# Patient Record
Sex: FEMALE | Race: WHITE | NOT HISPANIC OR LATINO | Employment: OTHER | URBAN - METROPOLITAN AREA
[De-identification: names, ages, dates, MRNs, and addresses within clinical notes are randomized per-mention and may not be internally consistent; named-entity substitution may affect disease eponyms.]

---

## 2017-01-26 ENCOUNTER — OFFICE VISIT (OUTPATIENT)
Dept: FAMILY MEDICINE | Facility: CLINIC | Age: 62
End: 2017-01-26
Payer: COMMERCIAL

## 2017-01-26 VITALS
OXYGEN SATURATION: 95 % | RESPIRATION RATE: 15 BRPM | BODY MASS INDEX: 33.99 KG/M2 | TEMPERATURE: 98.4 F | HEIGHT: 65 IN | WEIGHT: 204 LBS | SYSTOLIC BLOOD PRESSURE: 130 MMHG | DIASTOLIC BLOOD PRESSURE: 80 MMHG | HEART RATE: 74 BPM

## 2017-01-26 DIAGNOSIS — D17.1 BENIGN LIPOMATOUS NEOPLASM OF SKIN AND SUBCUTANEOUS TISSUE OF TRUNK: ICD-10-CM

## 2017-01-26 DIAGNOSIS — Z11.59 NEED FOR HEPATITIS C SCREENING TEST: ICD-10-CM

## 2017-01-26 DIAGNOSIS — R73.03 PRE-DIABETES: ICD-10-CM

## 2017-01-26 DIAGNOSIS — E03.9 HYPOTHYROIDISM, UNSPECIFIED TYPE: ICD-10-CM

## 2017-01-26 DIAGNOSIS — Z00.00 ROUTINE GENERAL MEDICAL EXAMINATION AT A HEALTH CARE FACILITY: Primary | ICD-10-CM

## 2017-01-26 DIAGNOSIS — F32.9 REACTIVE DEPRESSION (SITUATIONAL): ICD-10-CM

## 2017-01-26 LAB
ALBUMIN SERPL-MCNC: 4.1 G/DL (ref 3.4–5)
ALP SERPL-CCNC: 86 U/L (ref 40–150)
ALT SERPL W P-5'-P-CCNC: 22 U/L (ref 0–50)
ANION GAP SERPL CALCULATED.3IONS-SCNC: 8 MMOL/L (ref 3–14)
AST SERPL W P-5'-P-CCNC: 10 U/L (ref 0–45)
BILIRUB SERPL-MCNC: 0.4 MG/DL (ref 0.2–1.3)
BUN SERPL-MCNC: 16 MG/DL (ref 7–30)
CALCIUM SERPL-MCNC: 9.3 MG/DL (ref 8.5–10.1)
CHLORIDE SERPL-SCNC: 107 MMOL/L (ref 94–109)
CHOLEST SERPL-MCNC: 225 MG/DL
CO2 SERPL-SCNC: 27 MMOL/L (ref 20–32)
CREAT SERPL-MCNC: 0.95 MG/DL (ref 0.52–1.04)
GFR SERPL CREATININE-BSD FRML MDRD: 60 ML/MIN/1.7M2
GLUCOSE SERPL-MCNC: 97 MG/DL (ref 70–99)
HBA1C MFR BLD: 5.5 % (ref 4.3–6)
HDLC SERPL-MCNC: 53 MG/DL
LDLC SERPL CALC-MCNC: 129 MG/DL
NONHDLC SERPL-MCNC: 172 MG/DL
POTASSIUM SERPL-SCNC: 4.4 MMOL/L (ref 3.4–5.3)
PROT SERPL-MCNC: 7.4 G/DL (ref 6.8–8.8)
SODIUM SERPL-SCNC: 142 MMOL/L (ref 133–144)
T4 FREE SERPL-MCNC: 1.26 NG/DL (ref 0.76–1.46)
TRIGL SERPL-MCNC: 214 MG/DL
TSH SERPL DL<=0.05 MIU/L-ACNC: 0.7 MU/L (ref 0.4–4)

## 2017-01-26 PROCEDURE — 99396 PREV VISIT EST AGE 40-64: CPT | Performed by: INTERNAL MEDICINE

## 2017-01-26 PROCEDURE — 86803 HEPATITIS C AB TEST: CPT | Performed by: INTERNAL MEDICINE

## 2017-01-26 PROCEDURE — 84443 ASSAY THYROID STIM HORMONE: CPT | Performed by: INTERNAL MEDICINE

## 2017-01-26 PROCEDURE — 83036 HEMOGLOBIN GLYCOSYLATED A1C: CPT | Performed by: INTERNAL MEDICINE

## 2017-01-26 PROCEDURE — 80061 LIPID PANEL: CPT | Performed by: INTERNAL MEDICINE

## 2017-01-26 PROCEDURE — 36415 COLL VENOUS BLD VENIPUNCTURE: CPT | Performed by: INTERNAL MEDICINE

## 2017-01-26 PROCEDURE — 84439 ASSAY OF FREE THYROXINE: CPT | Performed by: INTERNAL MEDICINE

## 2017-01-26 PROCEDURE — 80053 COMPREHEN METABOLIC PANEL: CPT | Performed by: INTERNAL MEDICINE

## 2017-01-26 RX ORDER — LEVOTHYROXINE SODIUM 88 UG/1
88 TABLET ORAL DAILY
Qty: 90 TABLET | Refills: 3 | Status: SHIPPED | OUTPATIENT
Start: 2017-01-26 | End: 2020-09-22

## 2017-01-26 RX ORDER — BUPROPION HYDROCHLORIDE 300 MG/1
300 TABLET ORAL EVERY MORNING
Qty: 90 TABLET | Refills: 3 | Status: SHIPPED | OUTPATIENT
Start: 2017-01-26 | End: 2020-09-22

## 2017-01-26 ASSESSMENT — ANXIETY QUESTIONNAIRES
1. FEELING NERVOUS, ANXIOUS, OR ON EDGE: SEVERAL DAYS
7. FEELING AFRAID AS IF SOMETHING AWFUL MIGHT HAPPEN: SEVERAL DAYS
3. WORRYING TOO MUCH ABOUT DIFFERENT THINGS: NOT AT ALL
GAD7 TOTAL SCORE: 3
5. BEING SO RESTLESS THAT IT IS HARD TO SIT STILL: NOT AT ALL
6. BECOMING EASILY ANNOYED OR IRRITABLE: NOT AT ALL
2. NOT BEING ABLE TO STOP OR CONTROL WORRYING: SEVERAL DAYS

## 2017-01-26 ASSESSMENT — PATIENT HEALTH QUESTIONNAIRE - PHQ9: 5. POOR APPETITE OR OVEREATING: NOT AT ALL

## 2017-01-26 NOTE — NURSING NOTE
"Chief Complaint   Patient presents with     Physical     pt fasting      Thyroid Problem     Depression       Initial /80 mmHg  Pulse 74  Temp(Src) 98.4  F (36.9  C) (Oral)  Resp 15  Ht 5' 5\" (1.651 m)  Wt 204 lb (92.534 kg)  BMI 33.95 kg/m2  SpO2 95% Estimated body mass index is 33.95 kg/(m^2) as calculated from the following:    Height as of this encounter: 5' 5\" (1.651 m).    Weight as of this encounter: 204 lb (92.534 kg).  BP completed using cuff size: large    "

## 2017-01-26 NOTE — PROGRESS NOTES
SUBJECTIVE:     CC: Debra Ganser is an 61 year old woman who presents for preventive health visit.     Healthy Habits:    Do you get at least three servings of calcium containing foods daily (dairy, green leafy vegetables, etc.)? yes    Amount of exercise or daily activities, outside of work: 7 day(s) per week    Problems taking medications regularly No    Medication side effects: No    Have you had an eye exam in the past two years? no    Do you see a dentist twice per year? Yes    Do you have sleep apnea, excessive snoring or daytime drowsiness? no      Depression and Anxiety Follow-Up      Status since last visit: Worsened     Other associated symptoms: None    Complicating factors:     Significant life event: Yes -  in the nursing home, house she rents she may have to move.     Current substance abuse: None  PHQ-9 SCORE 1/19/2016 3/17/2016 1/26/2017   Total Score - - -   Total Score 4 4 4     CAITLYN-7 SCORE 1/19/2016 3/17/2016 1/26/2017   Total Score - - -   Total Score 4 3 3      PHQ-9  English    PHQ-9   Any Language   GAD7     Hypothyroidism Follow-up      Since last visit, patient describes the following symptoms: Weight stable, no hair loss, no skin changes, no constipation, no loose stools  TSH   Date Value Ref Range Status   01/18/2016 1.94 0.40 - 4.00 mU/L Final   No results found for: T4     Today's PHQ-2 Score:   PHQ-2 ( 1999 [employer]) 7/22/2016 4/22/2014   Q1: Little interest or pleasure in doing things 0 0   Q2: Feeling down, depressed or hopeless 0 0   PHQ-2 Score 0 0   Abuse: Current or Past (Physical, Sexual or Emotional)- No  Do you feel safe in your environment - Yes    Social History   Substance Use Topics     Smoking status: Former Smoker     Quit date: 01/01/2002     Smokeless tobacco: Never Used     Alcohol Use: 0.0 - 2.0 oz/week     0-4 drink(s) per week     The patient does not drink >3 drinks per day nor >7 drinks per week.    Recent Labs   Lab Test  01/18/16   0730  12/23/14   6820   11/01/13   1010   CHOL  212*  197  208*   HDL  48*  52  49*   LDL  120*  108  109   TRIG  222*  185*  250*   CHOLHDLRATIO   --   3.8  4.2   NHDL  164*   --    --      Reviewed orders with patient. Reviewed health maintenance and updated orders accordingly - Yes    Mammo Decision Support:  Patient over age 50, mutual decision to screen reflected in health maintenance.  Pertinent mammograms are reviewed under the imaging tab.    History of abnormal Pap smear: Status post benign hysterectomy. Health Maintenance and Surgical History updated.    All Histories reviewed and updated in Epic.  Past Medical History   Diagnosis Date     Hypothyroidism       Past Surgical History   Procedure Laterality Date     Hysterectomy, pap no longer indicated  2001     fibroid     Surgical history of -        bladder tack; pelvic support surgery     Surgical history of -   age 34     tonsillectomy     Surgical history of -        tummy tuck     Surgical history of -   2007     right hip replacement     ROS:  CONSTITUTIONAL: NEGATIVE for fever, chills, change in weight  INTEGUMENTARY/SKIN: NEGATIVE for worrisome rashes, moles or lesions  ENT: NEGATIVE for ear, mouth and throat problems  RESP: NEGATIVE for significant cough or SOB  BREAST: NEGATIVE for masses, tenderness or discharge  CV: NEGATIVE for chest pain, palpitations or peripheral edema  GI: NEGATIVE for nausea, abdominal pain, heartburn, or change in bowel habits  MUSCULOSKELETAL: POSITIVE for right shoulder pain, NEGATIVE for significant arthralgias or myalgia  NEURO: NEGATIVE for weakness, dizziness or paresthesias  ENDOCRINE: Hx of hypothyroidism - use of levothyroxine, fasting for labs, NEGATIVE for temperature intolerance, skin/hair changes  HEME/ALLERGY/IMMUNE: NEGATIVE for bleeding problems   PSYCHIATRIC: Hx of depression/anxiety - use of Wellbutrin, NEGATIVE for changes in mood or affect     This document serves as a record of the services and decisions personally  "performed and made by Latha Hudson MD. It was created on her behalf by Haritha Monsalve, a trained medical scribe. The creation of this document is based on the provider's statements to the medical scribe.   Haritha Monsalve, 11:30 AM, January 26, 2017    Problem list, Medication list, Allergies, and Medical/Social/Surgical histories reviewed in [facility] and updated as appropriate.  Labs reviewed in EPIC  BP Readings from Last 3 Encounters:   01/26/17 130/80   07/22/16 126/84   07/06/16 152/82    Wt Readings from Last 3 Encounters:   01/26/17 204 lb (92.534 kg)   07/22/16 203 lb 9.6 oz (92.352 kg)   07/06/16 205 lb 4.8 oz (93.123 kg)        Current Outpatient Prescriptions   Medication Sig Dispense Refill     levothyroxine (SYNTHROID/LEVOTHROID) 88 MCG tablet Take 1 tablet (88 mcg) by mouth daily 90 tablet 3     buPROPion (WELLBUTRIN XL) 300 MG 24 hr tablet Take 1 tablet (300 mg) by mouth every morning 90 tablet 3     [DISCONTINUED] buPROPion (WELLBUTRIN XL) 300 MG 24 hr tablet Take 1 tablet (300 mg) by mouth every morning 90 tablet 1     [DISCONTINUED] levothyroxine (SYNTHROID, LEVOTHROID) 88 MCG tablet Take 1 tablet (88 mcg) by mouth daily 90 tablet 3     OBJECTIVE:     /80 mmHg  Pulse 74  Temp(Src) 98.4  F (36.9  C) (Oral)  Resp 15  Ht 5' 5\" (1.651 m)  Wt 204 lb (92.534 kg)  BMI 33.95 kg/m2  SpO2 95%  EXAM:  GENERAL APPEARANCE: healthy, alert and no distress  HENT: ear canals and TM's normal, nose and mouth without ulcers or lesions, oral mucous membranes moist and oropharynx clear  NECK: no adenopathy, thyroid normal to palpation and no bruits  RESP: lungs clear to auscultation - no rales, rhonchi or wheezes  BREAST: normal without masses, tenderness or nipple discharge and no palpable axillary masses or adenopathy  CV: regular rates and rhythm and normal S1 S2, no peripheral edema  LYMPHATICS: normal ant/post cervical and supraclavicular nodes  ABDOMEN: soft, nontender, without hepatosplenomegaly or masses and " bowel sounds normal  MS: normal range of motion of upper extremities including forward flexion, extremities normal - no gross deformities noted  SKIN: benign lipoma to right upper shoulder without decreased or painful range of motion  , no suspicious lesions or rashes  NEURO: Normal strength and tone, mentation intact and speech normal  PSYCH: mentation appears normal and affect normal/bright    ASSESSMENT/PLAN:     (Z00.00) Routine general medical examination at a health care facility (primary encounter diagnosis)  Comment: HEALTH CARE MAINTENANCE and immunizations reviewed, pt prefers FIT test at this time - colonoscopy encouraged in the future, due for Hepatitis C screening, declined flu vaccination, clinical breast exam completed, fasting labs today   Plan: Comprehensive metabolic panel, Lipid panel         reflex to direct LDL, Fecal colorectal cancer         screen (FIT)          (E03.9) Hypothyroidism, unspecified type  Comment: Family hx of thyroid issues - mother, sisters, etc.; thyroid remains intact;  due for labs  Clinically euthyroid; labs today, consider dose adjustment if labs warrant   Plan: levothyroxine (SYNTHROID/LEVOTHROID) 88 MCG         tablet, T4 free, TSH          (F32.9) Reactive depression (situational)  Comment:  in nursing home, she may have to move from home currently renting, Wellbutrin stable.  Reviewed risk vsbenefit of meds;  She feels benefits outweigh risks  Plan: buPROPion (WELLBUTRIN XL) 300 MG 24 hr tablet          (R73.03) Pre-diabetes  Comment: no family hx of diabetes  A1C      5.5   1/26/2017  A1C      5.7   1/18/2016  Plan: Hemoglobin A1c          (Z11.59) Need for hepatitis C screening test  Comment: Hepatitis C screening recommended for adults born between 1945 and 1965  Plan: Hepatitis C Screen Reflex to HCV RNA Quant and         Genotype    (D17.1) Benign lipomatous neoplasm of skin and subcutaneous tissue of trunk  Comment: right shoulder; no decreased Range of  "Motion  Plan: no evaluation needed unless developed painful range of motion           COUNSELING:   Reviewed preventive health counseling, as reflected in patient instructions  Special attention given to:        Regular exercise       Healthy diet/nutrition       Colon cancer screening       Consider Hep C screening for patients born between 1945 and 1965    BP Screening:   Last 3 BP Readings:    BP Readings from Last 3 Encounters:   01/26/17 130/80   07/22/16 126/84   07/06/16 152/82   The following was recommended to the patient:  Re-screen BP within a year and recommended lifestyle modifications     reports that she quit smoking about 15 years ago. She has never used smokeless tobacco.    Estimated body mass index is 33.95 kg/(m^2) as calculated from the following:    Height as of this encounter: 5' 5\" (1.651 m).    Weight as of this encounter: 204 lb (92.534 kg).   Weight management plan: healthy diet and regular exercise encouraged    Counseling Resources:  ATP IV Guidelines  Pooled Cohorts Equation Calculator  Breast Cancer Risk Calculator  FRAX Risk Assessment  ICSI Preventive Guidelines  Dietary Guidelines for Americans, 2010  [employer]'s MyPlate  ASA Prophylaxis  Lung CA Screening    Latha Hudson MD  Internal Medicine  [facility] [place]  15 minutes in addition to HEALTH CARE MAINTENANCE are spent with patient, over 50% of that time spent providing counselling, discussing and reviewing medical conditions/concerns, meds and potential side effects.  Pt advised and acknowledges plan.    The information in this document, created by a medical scribe for me, accurately reflects the services I personally performed and the decisions made by me. I have reviewed and approved this document for accuracy.  Dr. Latha Hudson, 11:50 AM, January 26, 2017  "

## 2017-01-26 NOTE — Clinical Note
[facility]  [address]  [place] [address]  [phone]       February 6, 2017      Debra Ganser  [address] [address]        Dear Ms. Debra Ganser,    A copy of your results have been enclosed for your review.     Labs reviewed. Continue current meds and healthy lifestyle choices; diet and exercise.  Cholesterol is slightly elevated; best treated with maximizing diet and exercise.  HEP C screening is neg    If you have any questions or concerns please contact us.          Latha Hudson MD      Sb enc

## 2017-01-26 NOTE — MR AVS SNAPSHOT
After Visit Summary   1/26/2017    Debra Ganser    MRN: [id number]           Patient Information     Date Of Birth          1955        Visit Information        Provider Department      1/26/2017 11:00 AM Latha Hudson MD [facility]        Today's Diagnoses     Routine general medical examination at a health care facility    -  1     Hypothyroidism, unspecified type         Reactive depression (situational)         Pre-diabetes         Need for hepatitis C screening test         Benign lipomatous neoplasm of skin and subcutaneous tissue of trunk           Care Instructions      Preventive Health Recommendations  Female Ages 50 - 64    Yearly exam: See your health care provider every year in order to  o Review health changes.   o Discuss preventive care.    o Review your medicines if your doctor has prescribed any.      Get a Pap test every three years (unless you have an abnormal result and your provider advises testing more often).    If you get Pap tests with HPV test, you only need to test every 5 years, unless you have an abnormal result.     You do not need a Pap test if your uterus was removed (hysterectomy) and you have not had cancer.    You should be tested each year for STDs (sexually transmitted diseases) if you're at risk.     Have a mammogram every 1 to 2 years.    Have a colonoscopy at age 50, or have a yearly FIT test (stool test). These exams screen for colon cancer.      Have a cholesterol test every 5 years, or more often if advised.    Have a diabetes test (fasting glucose) every three years. If you are at risk for diabetes, you should have this test more often.     If you are at risk for osteoporosis (brittle bone disease), think about having a bone density scan (DEXA).    Shots: Get a flu shot each year. Get a tetanus shot every 10 years.    Nutrition:     Eat at least 5 servings of fruits and vegetables each day.    Eat whole-grain bread, whole-wheat  "pasta and brown rice instead of white grains and rice.    Talk to your provider about Calcium and Vitamin D.     Lifestyle    Exercise at least 150 minutes a week (30 minutes a day, 5 days a week). This will help you control your weight and prevent disease.    Limit alcohol to one drink per day.    No smoking.     Wear sunscreen to prevent skin cancer.     See your dentist every six months for an exam and cleaning.    See your eye doctor every 1 to 2 years.            Follow-ups after your visit        Future tests that were ordered for you today     Open Future Orders        Priority Expected Expires Ordered    Fecal colorectal cancer screen (FIT) Routine 2/16/2017 4/20/2017 1/26/2017            Who to contact     If you have questions or need follow up information about today's clinic visit or your schedule please contact [facility] directly at [phone].  Normal or non-critical lab and imaging results will be communicated to you by Wafflehart, letter or phone within 4 business days after the clinic has received the results. If you do not hear from us within 7 days, please contact the clinic through Wafflehart or phone. If you have a critical or abnormal lab result, we will notify you by phone as soon as possible.  Submit refill requests through BIlprospekt or call your pharmacy and they will forward the refill request to us. Please allow 3 business days for your refill to be completed.          Additional Information About Your Visit        BIlprospekt Information     BIlprospekt lets you send messages to your doctor, view your test results, renew your prescriptions, schedule appointments and more. To sign up, go to [email or web address] . Click on \"Log in\" on the left side of the screen, which will take you to the Welcome page. Then click on \"Sign up Now\" on the right side of the page.     You will be asked to enter the access code listed below, as well as some personal information. Please follow the " "directions to create your username and password.     Your access code is: [email or web address]  Expires: 2017 11:45 AM     Your access code will  in 90 days. If you need help or a new code, please call your [facility] clinic or [phone].        Care EveryWhere ID     This is your Care EveryWhere ID. This could be used by other organizations to access your [facility] medical records  [id number]        Your Vitals Were     Pulse Temperature Respirations Height BMI (Body Mass Index) Pulse Oximetry    74 98.4  F (36.9  C) (Oral) 15 5' 5\" (1.651 m) 33.95 kg/m2 95%       Blood Pressure from Last 3 Encounters:   17 130/80   16 126/84   16 152/82    Weight from Last 3 Encounters:   17 204 lb (92.534 kg)   16 203 lb 9.6 oz (92.352 kg)   16 205 lb 4.8 oz (93.123 kg)              We Performed the Following     Comprehensive metabolic panel     Hemoglobin A1c     Hepatitis C Screen Reflex to HCV RNA Quant and Genotype     Lipid panel reflex to direct LDL     T4 free     TSH          Where to get your medicines      These medications were sent to [facility] [address] [place] [address]     Phone:  [phone]    - buPROPion 300 MG 24 hr tablet  - levothyroxine 88 MCG tablet       Primary Care Provider Office Phone # Fax #    Tori Becker [phone]       [facility] [address] [address]        Thank you!     Thank you for choosing [facility]  for your care. Our goal is always to provide you with excellent care. Hearing back from our patients is one way we can continue to improve our services. Please take a few minutes to complete the written survey that you may receive in the mail after your visit with us. Thank you!             Your Updated Medication List - Protect others around you: Learn how to safely use, store and throw away your medicines at " www.disposemymeds.org.          This list is accurate as of: 1/26/17 11:45 AM.  Always use your most recent med list.                   Brand Name Dispense Instructions for use    buPROPion 300 MG 24 hr tablet    WELLBUTRIN XL    90 tablet    Take 1 tablet (300 mg) by mouth every morning       levothyroxine 88 MCG tablet    SYNTHROID/LEVOTHROID    90 tablet    Take 1 tablet (88 mcg) by mouth daily

## 2017-01-27 LAB — HCV AB SERPL QL IA: NORMAL

## 2017-01-27 ASSESSMENT — PATIENT HEALTH QUESTIONNAIRE - PHQ9: SUM OF ALL RESPONSES TO PHQ QUESTIONS 1-9: 4

## 2017-01-27 ASSESSMENT — ANXIETY QUESTIONNAIRES: GAD7 TOTAL SCORE: 3

## 2017-01-31 NOTE — PROGRESS NOTES
Quick Note:    Labs reviewed. Continue current meds and healthy lifestyle choices; diet and exercise.  Cholesterol is slightly elevated; best treated with maximizing diet and exercise.  HEP C screening is neg  ______

## 2017-02-27 ENCOUNTER — DOCUMENTATION ONLY (OUTPATIENT)
Dept: FAMILY MEDICINE | Facility: CLINIC | Age: 62
End: 2017-02-27

## 2017-02-27 NOTE — PROGRESS NOTES
Panel Management Review      Patient has the following on her problem list:     Depression / Dysthymia review  PHQ-9 SCORE 1/19/2016 3/17/2016 1/26/2017   Total Score - - -   Total Score 4 4 4      Patient is due for:  None      Composite cancer screening  Chart review shows that this patient is due/due soon for the following Fecal Colorectal (FIT)  Summary:    Patient is due/failing the following:   FIT    Action needed:   Patient need to complete FIT Test ordered 1/26/2017    Type of outreach:    Sent letter.    Questions for provider review:    None                                                                                                                                    Corrine Tony CMA (AAMA) 2/27/2017 4:40 PM       Chart routed to  .

## 2017-02-27 NOTE — LETTER
February 27, 2017      Debra Ganser  [address] [address] [address]        Dear Ms. Rodrígueza Ganser,    Our records show that you are currently due for colon cancer screening either with a colonoscopy or a stool test (FIT Test).   Please call our clinic at [phone] to have these orders placed and we can assist you in scheduling this appointment.    If you have already had this completed please contact our clinic so we can update your chart.     If you have further questions or concerns please feel free to contact us by calling our clinic at [phone].      Sincerely,     Tori Becker MD/elaine

## 2017-03-07 ENCOUNTER — TELEPHONE (OUTPATIENT)
Dept: FAMILY MEDICINE | Facility: CLINIC | Age: 62
End: 2017-03-07

## 2017-03-07 DIAGNOSIS — H10.32 ACUTE CONJUNCTIVITIS OF LEFT EYE: Primary | ICD-10-CM

## 2017-03-07 RX ORDER — POLYMYXIN B SULFATE AND TRIMETHOPRIM 1; 10000 MG/ML; [USP'U]/ML
1 SOLUTION OPHTHALMIC EVERY 4 HOURS
Qty: 1 BOTTLE | Refills: 0 | Status: SHIPPED | OUTPATIENT
Start: 2017-03-07 | End: 2017-03-14

## 2017-03-07 NOTE — TELEPHONE ENCOUNTER
RN Conjunctivitis Protocol: Ages 2 and older  Debra Ganser is a 61 year old female is having symptoms reviewed for possible conjunctivitis.      ASSESSMENT/PLAN:  Allergy to Sulfa?  No   1.  Medication Indicated: YES - POLYTRIM 11139-4.1 UNIT/ML-% OP Sol, 1 drop in affected eye(s) 4 times daily while awake x 7 days. .    2.  Education regarding contact precautions, hand washing, avoid wearing contacts until finished with drops or until symptoms resolve, contact clinic if there is no improvement of symptoms within 3 days and if develops eye pain or sensitivity to light.   3.  Follow-up: Schedule an appointment with a provider if symptoms do not improve after 3 days of antibiotics or if symptoms return after antibiotic therapy is complete.  4.  Patient verbalized understanding of this plan and is agreeable.    SUBJECTIVE:     Conjunctival symptoms: redness and mattering (yellow/green)   Location: left eye  Onset: 1 day ago  In addition notes: None  Contact Lens use?: No  Complicating factors    Reports:NONE  Denies:Vision changes; not cleared with blinking, Recent  history of eye trauma, Sensitivity to light, Severe eye pain, Fever: none, Eyelid symptoms None      OBJECTIVE:      NURSING PLAN: Nursing advice to patient warm compresses.     EDUCATION:      RECOMMENDED DISPOSITION:  See in 72 hours - warm compresses.  Will comply with recommendation: Yes  Encounter handled by: Nurse Triage.     Mary Lou Escudero RN    Rx sent in patient will  later. 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 Woke up with crusting of her eye lid this morning.

## 2017-03-17 PROCEDURE — 82274 ASSAY TEST FOR BLOOD FECAL: CPT | Performed by: INTERNAL MEDICINE

## 2017-03-20 DIAGNOSIS — Z00.00 ROUTINE GENERAL MEDICAL EXAMINATION AT A HEALTH CARE FACILITY: ICD-10-CM

## 2017-03-20 LAB — HEMOCCULT STL QL IA: NEGATIVE

## 2020-09-22 ENCOUNTER — OFFICE VISIT (OUTPATIENT)
Dept: FAMILY MEDICINE | Facility: CLINIC | Age: 65
End: 2020-09-22
Payer: COMMERCIAL

## 2020-09-22 VITALS
RESPIRATION RATE: 16 BRPM | BODY MASS INDEX: 37.49 KG/M2 | SYSTOLIC BLOOD PRESSURE: 136 MMHG | HEIGHT: 65 IN | TEMPERATURE: 98.4 F | OXYGEN SATURATION: 96 % | WEIGHT: 225 LBS | DIASTOLIC BLOOD PRESSURE: 76 MMHG | HEART RATE: 83 BPM

## 2020-09-22 DIAGNOSIS — E03.9 HYPOTHYROIDISM, UNSPECIFIED TYPE: ICD-10-CM

## 2020-09-22 DIAGNOSIS — F32.9 REACTIVE DEPRESSION (SITUATIONAL): ICD-10-CM

## 2020-09-22 DIAGNOSIS — H61.23 BILATERAL IMPACTED CERUMEN: ICD-10-CM

## 2020-09-22 DIAGNOSIS — R73.03 PRE-DIABETES: ICD-10-CM

## 2020-09-22 DIAGNOSIS — Z12.31 ENCOUNTER FOR SCREENING MAMMOGRAM FOR BREAST CANCER: ICD-10-CM

## 2020-09-22 DIAGNOSIS — E66.01 MORBID OBESITY (H): ICD-10-CM

## 2020-09-22 DIAGNOSIS — Z00.00 ROUTINE GENERAL MEDICAL EXAMINATION AT A HEALTH CARE FACILITY: Primary | ICD-10-CM

## 2020-09-22 DIAGNOSIS — Z13.6 CARDIOVASCULAR SCREENING; LDL GOAL LESS THAN 160: ICD-10-CM

## 2020-09-22 LAB — HBA1C MFR BLD: 5.5 % (ref 0–5.6)

## 2020-09-22 PROCEDURE — 99386 PREV VISIT NEW AGE 40-64: CPT | Performed by: PHYSICIAN ASSISTANT

## 2020-09-22 PROCEDURE — 80061 LIPID PANEL: CPT | Performed by: PHYSICIAN ASSISTANT

## 2020-09-22 PROCEDURE — 80048 BASIC METABOLIC PNL TOTAL CA: CPT | Performed by: PHYSICIAN ASSISTANT

## 2020-09-22 PROCEDURE — 83036 HEMOGLOBIN GLYCOSYLATED A1C: CPT | Performed by: PHYSICIAN ASSISTANT

## 2020-09-22 PROCEDURE — 84443 ASSAY THYROID STIM HORMONE: CPT | Performed by: PHYSICIAN ASSISTANT

## 2020-09-22 PROCEDURE — 36415 COLL VENOUS BLD VENIPUNCTURE: CPT | Performed by: PHYSICIAN ASSISTANT

## 2020-09-22 RX ORDER — BUPROPION HYDROCHLORIDE 300 MG/1
300 TABLET ORAL EVERY MORNING
Qty: 90 TABLET | Refills: 3 | Status: SHIPPED | OUTPATIENT
Start: 2020-09-22 | End: 2021-10-21

## 2020-09-22 RX ORDER — LEVOTHYROXINE SODIUM 88 UG/1
88 TABLET ORAL DAILY
Qty: 90 TABLET | Refills: 3 | Status: SHIPPED | OUTPATIENT
Start: 2020-09-22 | End: 2021-10-21

## 2020-09-22 ASSESSMENT — ENCOUNTER SYMPTOMS
HEARTBURN: 0
DYSURIA: 0
HEADACHES: 0
PALPITATIONS: 0
NERVOUS/ANXIOUS: 0
BREAST MASS: 0
FEVER: 0
DIZZINESS: 0
FREQUENCY: 0
DIARRHEA: 0
WEAKNESS: 0
ARTHRALGIAS: 0
COUGH: 0
NAUSEA: 0
SHORTNESS OF BREATH: 0
HEMATOCHEZIA: 0
JOINT SWELLING: 0
CONSTIPATION: 0
CHILLS: 0
MYALGIAS: 0
EYE PAIN: 0
HEMATURIA: 0
SORE THROAT: 0
ABDOMINAL PAIN: 0
PARESTHESIAS: 0

## 2020-09-22 ASSESSMENT — ANXIETY QUESTIONNAIRES
GAD7 TOTAL SCORE: 0
6. BECOMING EASILY ANNOYED OR IRRITABLE: NOT AT ALL
1. FEELING NERVOUS, ANXIOUS, OR ON EDGE: NOT AT ALL
2. NOT BEING ABLE TO STOP OR CONTROL WORRYING: NOT AT ALL
5. BEING SO RESTLESS THAT IT IS HARD TO SIT STILL: NOT AT ALL
7. FEELING AFRAID AS IF SOMETHING AWFUL MIGHT HAPPEN: NOT AT ALL
3. WORRYING TOO MUCH ABOUT DIFFERENT THINGS: NOT AT ALL

## 2020-09-22 ASSESSMENT — PATIENT HEALTH QUESTIONNAIRE - PHQ9
SUM OF ALL RESPONSES TO PHQ QUESTIONS 1-9: 1
5. POOR APPETITE OR OVEREATING: NOT AT ALL

## 2020-09-22 ASSESSMENT — MIFFLIN-ST. JEOR: SCORE: 1580.43

## 2020-09-22 NOTE — PATIENT INSTRUCTIONS
Check with insurance about coverage for shingles vaccine (Shingrix).  Check with insurance and schedule mammogram.        Preventive Health Recommendations  Female Ages 50 - 64    Yearly exam: See your health care provider every year in order to  o Review health changes.   o Discuss preventive care.    o Review your medicines if your doctor has prescribed any.      Get a Pap test every three years (unless you have an abnormal result and your provider advises testing more often).    If you get Pap tests with HPV test, you only need to test every 5 years, unless you have an abnormal result.     You do not need a Pap test if your uterus was removed (hysterectomy) and you have not had cancer.    You should be tested each year for STDs (sexually transmitted diseases) if you're at risk.     Have a mammogram every 1 to 2 years.    Have a colonoscopy at age 50, or have a yearly FIT test (stool test). These exams screen for colon cancer.      Have a cholesterol test every 5 years, or more often if advised.    Have a diabetes test (fasting glucose) every three years. If you are at risk for diabetes, you should have this test more often.     If you are at risk for osteoporosis (brittle bone disease), think about having a bone density scan (DEXA).    Shots: Get a flu shot each year. Get a tetanus shot every 10 years.    Nutrition:     Eat at least 5 servings of fruits and vegetables each day.    Eat whole-grain bread, whole-wheat pasta and brown rice instead of white grains and rice.    Get adequate Calcium and Vitamin D.     Lifestyle    Exercise at least 150 minutes a week (30 minutes a day, 5 days a week). This will help you control your weight and prevent disease.    Limit alcohol to one drink per day.    No smoking.     Wear sunscreen to prevent skin cancer.     See your dentist every six months for an exam and cleaning.    See your eye doctor every 1 to 2 years.

## 2020-09-22 NOTE — PROGRESS NOTES
Patient identified using two patient identifiers.  Ear exam showing wax occlusion completed by .  Solution: warm water was placed in the bilateral ear(s) via irrigation tool: elephant ear Ofeliajamison Martínez LPN.   SUBJECTIVE:   CC: Debra Ganser is an 65 year old woman who presents for preventive health visit.     Patient has been advised of split billing requirements and indicates understanding: Yes  Healthy Habits:     Getting at least 3 servings of Calcium per day:  Yes    Bi-annual eye exam:  Yes    Dental care twice a year:  NO (usually annually)    Sleep apnea or symptoms of sleep apnea:  None    Diet:  Regular (no restrictions)    Frequency of exercise:  6-7 days/week (walks dog twice daily)    Duration of exercise:  15-30 minutes    Taking medications regularly:  Yes    Medication side effects:  None    PHQ-2 Total Score: 0    Additional concerns today:  No    Previously seen at this clinic but switched a few years ago to [facility] due to insurance change. Insurance changed again and she will be seen here moving forward.     Today's PHQ-2 Score:   PHQ-2 (  [employer]) 2020   Q1: Little interest or pleasure in doing things 0   Q2: Feeling down, depressed or hopeless 0   PHQ-2 Score 0   Q1: Little interest or pleasure in doing things Not at all   Q2: Feeling down, depressed or hopeless Not at all   PHQ-2 Score 0       Abuse: Current or Past (Physical, Sexual or Emotional) - No  Do you feel safe in your environment? Yes        Social History     Tobacco Use     Smoking status: Former Smoker     Last attempt to quit: 2002     Years since quittin.7     Smokeless tobacco: Never Used   Substance Use Topics     Alcohol use: Yes     Alcohol/week: 0.0 - 3.3 standard drinks     If you drink alcohol do you typically have >3 drinks per day or >7 drinks per week? No    Alcohol Use 2020   Prescreen: >3 drinks/day or >7 drinks/week? No   Prescreen: >3 drinks/day or >7 drinks/week? -       Reviewed orders  with patient.  Reviewed health maintenance and updated orders accordingly - Yes  Lab work is in process  Labs reviewed in EPIC  BP Readings from Last 3 Encounters:   20 136/76   17 130/80   16 126/84    Wt Readings from Last 3 Encounters:   20 102.1 kg (225 lb)   17 92.5 kg (204 lb)   16 92.4 kg (203 lb 9.6 oz)                  Patient Active Problem List   Diagnosis     Reactive depression (situational)     Hypothyroidism     CARDIOVASCULAR SCREENING; LDL GOAL LESS THAN 160     OA (osteoarthritis)     Pre-diabetes     Advanced directives, counseling/discussion     Elevated blood pressure reading without diagnosis of hypertension     Morbid obesity (H)     Past Surgical History:   Procedure Laterality Date     HYSTERECTOMY, PAP NO LONGER INDICATED      fibroid     SURGICAL HISTORY OF -       bladder tack; pelvic support surgery     SURGICAL HISTORY OF -   age 34    tonsillectomy     SURGICAL HISTORY OF -       tummy tuck     SURGICAL HISTORY OF -       right hip replacement       Social History     Tobacco Use     Smoking status: Former Smoker     Last attempt to quit: 2002     Years since quittin.7     Smokeless tobacco: Never Used   Substance Use Topics     Alcohol use: Yes     Alcohol/week: 0.0 - 3.3 standard drinks     Family History   Problem Relation Age of Onset     Endocrine Disease Mother         hypothyroid     Depression Mother      Lung Cancer Mother      Depression Maternal Grandmother      Endocrine Disease Sister         diabetes     Endocrine Disease Maternal Uncle         diabetes     Hypertension Father      Connective Tissue Disorder Sister         RA     Hypothyroidism Sister      Multiple Sclerosis Sister          Current Outpatient Medications   Medication Sig Dispense Refill     buPROPion (WELLBUTRIN XL) 300 MG 24 hr tablet Take 1 tablet (300 mg) by mouth every morning 90 tablet 3     levothyroxine (SYNTHROID/LEVOTHROID) 88 MCG tablet Take 1  tablet (88 mcg) by mouth daily 90 tablet 3     Allergies   Allergen Reactions     Codeine Nausea and Vomiting     Horrible nausea and vomiting     Fentanyl Nausea and Vomiting     Horrible nausea and vomiting     Morphine Hcl      No Morphine based med--sweaty and vomiting     Recent Labs   Lab Test 09/22/20  1135 01/26/17  1148 01/18/16  0730 12/23/14  0923   A1C 5.5 5.5 5.7 5.9   LDL  --  129* 120* 108   HDL  --  53 48* 52   TRIG  --  214* 222* 185*   ALT  --  22  --  20   CR  --  0.95  --  0.98   GFRESTIMATED  --  60*  --  59*   GFRESTBLACK  --  73  --  71   POTASSIUM  --  4.4  --  4.0   TSH  --  0.70 1.94 0.82        Mammogram Screening: Patient over age 50, mutual decision to screen reflected in health maintenance.    Colonoscopy 10/5/18, diverticulosis, internal/external hemorrhoids, repeat 10 years.    Pertinent mammograms are reviewed under the imaging tab.  History of abnormal Pap smear: Status post benign hysterectomy. Health Maintenance and Surgical History updated.     Reviewed and updated as needed this visit by clinical staff  Tobacco  Allergies  Meds  Problems  Med Hx  Surg Hx  Fam Hx  Soc Hx          Reviewed and updated as needed this visit by Provider  Tobacco  Allergies  Meds  Problems  Med Hx  Surg Hx  Fam Hx  Soc Hx             Review of Systems   Constitutional: Negative for chills and fever.   HENT: Negative for congestion, ear pain, hearing loss and sore throat.    Eyes: Negative for pain and visual disturbance.   Respiratory: Negative for cough and shortness of breath.    Cardiovascular: Negative for chest pain, palpitations and peripheral edema.   Gastrointestinal: Negative for abdominal pain, constipation, diarrhea, heartburn, hematochezia and nausea.   Breasts:  Negative for tenderness, breast mass and discharge.   Genitourinary: Negative for dysuria, frequency, genital sores, hematuria, pelvic pain, urgency, vaginal bleeding and vaginal discharge.   Musculoskeletal:  "Negative for arthralgias, joint swelling and myalgias.   Skin: Negative for rash.   Neurological: Negative for dizziness, weakness, headaches and paresthesias.   Psychiatric/Behavioral: Negative for mood changes. The patient is not nervous/anxious.        OBJECTIVE:   /76 (BP Location: Right arm, Patient Position: Sitting, Cuff Size: Adult Large)   Pulse 83   Temp 98.4  F (36.9  C) (Oral)   Resp 16   Ht 1.657 m (5' 5.25\")   Wt 102.1 kg (225 lb)   SpO2 96%   BMI 37.16 kg/m    Physical Exam  GENERAL: healthy, alert and no distress  EYES: Eyes grossly normal to inspection, PERRL and conjunctivae and sclerae normal  HENT: normal cephalic/atraumatic, both ears: occluded with wax (normal after cerumen removal), nose and mouth without ulcers or lesions, oropharynx clear and oral mucous membranes moist  NECK: no adenopathy, no asymmetry, masses, or scars and thyroid normal to palpation  RESP: lungs clear to auscultation - no rales, rhonchi or wheezes  BREAST: normal without masses, tenderness or nipple discharge and no palpable axillary masses or adenopathy  CV: regular rate and rhythm, normal S1 S2, no S3 or S4, no murmur, click or rub, no peripheral edema and peripheral pulses strong  ABDOMEN: soft, nontender, no hepatosplenomegaly, no masses and bowel sounds normal  MS: no gross musculoskeletal defects noted, no edema  SKIN: no suspicious lesions or rashes  NEURO: Normal strength and tone, mentation intact and speech normal  PSYCH: mentation appears normal, affect normal/bright    Diagnostic Test Results:  Labs reviewed in Epic  Results for orders placed or performed in visit on 09/22/20 (from the past 24 hour(s))   Hemoglobin A1c   Result Value Ref Range    Hemoglobin A1C 5.5 0 - 5.6 %       ASSESSMENT/PLAN:   1. Routine general medical examination at a health care facility  Reviewed personal and family history. Reviewed age appropriate screenings. Recommended any needed vaccinations (had flu vaccine last " "week, will check with insurance about Shingrix). Continue to focus on well balanced diet and exercise.  - Basic metabolic panel  (Ca, Cl, CO2, Creat, Gluc, K, Na, BUN)  - Lipid panel reflex to direct LDL Fasting    2. Morbid obesity (H)  Discussed weight loss, diet, exercise    3. Pre-diabetes  A1c today normal at 5.5. Recommend diet, exercise. Will continue to monitor.  - Basic metabolic panel  (Ca, Cl, CO2, Creat, Gluc, K, Na, BUN)  - Hemoglobin A1c    4. Hypothyroidism, unspecified type  Chronic issue, clinically euthyroid. Will check TSH. Meds refilled.  - TSH with free T4 reflex  - levothyroxine (SYNTHROID/LEVOTHROID) 88 MCG tablet; Take 1 tablet (88 mcg) by mouth daily  Dispense: 90 tablet; Refill: 3    5. CARDIOVASCULAR SCREENING; LDL GOAL LESS THAN 160  - Lipid panel reflex to direct LDL Fasting    6. Encounter for screening mammogram for breast cancer  - *MA Screening Digital Bilateral; Future    7. Bilateral impacted cerumen  Cerumen removed without complication, ears feel better after removal.  - REMOVE IMPACTED CERUMEN    8. Reactive depression (situational)  Chronic issue, stable on Wellbutrin, no side effects. PHQ/CAITLYN updated today. No HI/SI. Med refill given.  - buPROPion (WELLBUTRIN XL) 300 MG 24 hr tablet; Take 1 tablet (300 mg) by mouth every morning  Dispense: 90 tablet; Refill: 3    Patient has been advised of split billing requirements and indicates understanding: Yes  COUNSELING:  Reviewed preventive health counseling, as reflected in patient instructions    Estimated body mass index is 37.16 kg/m  as calculated from the following:    Height as of this encounter: 1.657 m (5' 5.25\").    Weight as of this encounter: 102.1 kg (225 lb).    Weight management plan: Discussed healthy diet and exercise guidelines    She reports that she quit smoking about 18 years ago. She has never used smokeless tobacco.      Counseling Resources:  ATP IV Guidelines  Pooled Cohorts Equation Calculator  Breast Cancer " Risk Calculator  BRCA-Related Cancer Risk Assessment: FHS-7 Tool  FRAX Risk Assessment  ICSI Preventive Guidelines  Dietary Guidelines for Americans, 2010  USDA's MyPlate  ASA Prophylaxis  Lung CA Screening    Cyndie Moe PA-C  [facility]

## 2020-09-23 LAB
ANION GAP SERPL CALCULATED.3IONS-SCNC: 5 MMOL/L (ref 3–14)
BUN SERPL-MCNC: 11 MG/DL (ref 7–30)
CALCIUM SERPL-MCNC: 8.8 MG/DL (ref 8.5–10.1)
CHLORIDE SERPL-SCNC: 109 MMOL/L (ref 94–109)
CHOLEST SERPL-MCNC: 235 MG/DL
CO2 SERPL-SCNC: 26 MMOL/L (ref 20–32)
CREAT SERPL-MCNC: 0.93 MG/DL (ref 0.52–1.04)
GFR SERPL CREATININE-BSD FRML MDRD: 65 ML/MIN/{1.73_M2}
GLUCOSE SERPL-MCNC: 99 MG/DL (ref 70–99)
HDLC SERPL-MCNC: 55 MG/DL
LDLC SERPL CALC-MCNC: 132 MG/DL
NONHDLC SERPL-MCNC: 180 MG/DL
POTASSIUM SERPL-SCNC: 4.5 MMOL/L (ref 3.4–5.3)
SODIUM SERPL-SCNC: 140 MMOL/L (ref 133–144)
TRIGL SERPL-MCNC: 239 MG/DL
TSH SERPL DL<=0.005 MIU/L-ACNC: 0.88 MU/L (ref 0.4–4)

## 2020-09-23 ASSESSMENT — ANXIETY QUESTIONNAIRES: GAD7 TOTAL SCORE: 0

## 2021-04-14 ENCOUNTER — OFFICE VISIT (OUTPATIENT)
Dept: FAMILY MEDICINE | Facility: CLINIC | Age: 66
End: 2021-04-14
Payer: COMMERCIAL

## 2021-04-14 ENCOUNTER — ANCILLARY PROCEDURE (OUTPATIENT)
Dept: GENERAL RADIOLOGY | Facility: CLINIC | Age: 66
End: 2021-04-14
Attending: FAMILY MEDICINE
Payer: COMMERCIAL

## 2021-04-14 VITALS
RESPIRATION RATE: 14 BRPM | OXYGEN SATURATION: 96 % | SYSTOLIC BLOOD PRESSURE: 132 MMHG | DIASTOLIC BLOOD PRESSURE: 76 MMHG | TEMPERATURE: 98.5 F | HEART RATE: 104 BPM | BODY MASS INDEX: 34.84 KG/M2 | WEIGHT: 211 LBS

## 2021-04-14 DIAGNOSIS — M17.12 PRIMARY OSTEOARTHRITIS OF LEFT KNEE: ICD-10-CM

## 2021-04-14 DIAGNOSIS — M25.562 ACUTE PAIN OF LEFT KNEE: ICD-10-CM

## 2021-04-14 PROCEDURE — 73562 X-RAY EXAM OF KNEE 3: CPT | Mod: LT | Performed by: RADIOLOGY

## 2021-04-14 PROCEDURE — 99213 OFFICE O/P EST LOW 20 MIN: CPT | Performed by: FAMILY MEDICINE

## 2021-04-14 RX ORDER — CHLORAL HYDRATE 500 MG
2 CAPSULE ORAL
COMMUNITY

## 2021-04-14 RX ORDER — ACETAMINOPHEN 500 MG
1000 TABLET ORAL
COMMUNITY
Start: 2019-07-12 | End: 2021-11-22

## 2021-04-14 ASSESSMENT — PATIENT HEALTH QUESTIONNAIRE - PHQ9: SUM OF ALL RESPONSES TO PHQ QUESTIONS 1-9: 3

## 2021-04-14 NOTE — PROGRESS NOTES
"    Assessment & Plan     Primary osteoarthritis of left knee  Patient continues knee support as needed for relief of discomfort, cold pack, and over-the-counter pain relievers.  I encouraged her to continue activity as tolerated.  She may contact me if she desires a referral to physical therapy, or orthopedic specialist for further assessment.  - Knee Supplies Order for DME - ONLY FOR DME    Acute pain of left knee    - REVIEW OF HEALTH MAINTENANCE PROTOCOL ORDERS  - XR Knee Left 3 Views  - Knee Supplies Order for DME - ONLY FOR DME               BMI:   Estimated body mass index is 34.84 kg/m  as calculated from the following:    Height as of 9/22/20: 1.657 m (5' 5.25\").    Weight as of this encounter: 95.7 kg (211 lb).           Return in about 2 weeks (around 4/28/2021), or if symptoms worsen or fail to improve.    Moody Correa DO  [facility]    May Azul is a 65 year old who presents for the following health issues     Musculoskeletal Problem    History of Present Illness       She eats 2-3 servings of fruits and vegetables daily.She consumes 0 sweetened beverage(s) daily.She exercises with enough effort to increase her heart rate 9 or less minutes per day.  She exercises with enough effort to increase her heart rate 3 or less days per week.   She is taking medications regularly.       Musculoskeletal problem/pain  Onset/Duration: March 17  Description  Location: knee - left  Joint Swelling: YES  Redness: no  Pain: YES  Warmth: no  Intensity:  moderate  Progression of Symptoms:  worsening and intermittent- worsening at night when lying down- past week  Accompanying signs and symptoms:   Fevers: no  Numbness/tingling/weakness: no- feels tight in knee  History  Trauma to the area: no  Recent illness:  no  Previous similar problem: no  Previous evaluation:  no  Precipitating or alleviating factors:  Aggravating factors include: lying down  Therapies tried and outcome: heat and " aspirin- helps, aleve- did not help        Review of Systems   Patient is seen for chief concern of left knee discomfort.    On March 17th, she was taking her mom down some stairs with a wheelchair with your brother, after which she had pain in left knee especially at night, and with any movement.     The swelling and pain are better today with rest.    She points to her medial knee area as her area of discomfort.      Objective    /76 (Cuff Size: Adult Large)   Pulse 104   Temp 98.5  F (36.9  C) (Oral)   Resp 14   Wt 95.7 kg (211 lb)   SpO2 96%   BMI 34.84 kg/m    Body mass index is 34.84 kg/m .  Physical Exam   Vital signs reviewed.  Patient is in no acute appearing distress.  Breathing appears nonlabored.  Patient is alert and oriented ×3.  Patient is very pleasant, making good eye contact and responding with clear fluent speech.  She can get up and down from exam room chair and table without visible difficulty.    Left knee exam: Patient has slight medial knee edema and increased skin temperature.  She has mild pain here with external foot ration during Drea's testing. No palpable or audible crepitus.  She can actively flex and extend at left knee and normal range of motion without discomfort when not weightbearing.  No laxity or discomfort noted with distraction of collateral ligaments, or ACL or PCL ligaments.    I reviewed the x-ray study with patient at time of exam.  We discussed the joint space narrowing in the medial knee, which is likely related to her discomfort.  Xr Knee Left 3 Views    Result Date: 4/14/2021  LEFT KNEE THREE VIEWS  4/14/2021 10:10 AM HISTORY:  Acute pain of left knee. FINDINGS: Mild patellofemoral and lateral compartment osteophytosis.     IMPRESSION: Medial compartment osteoarthritis with prominent joint space narrowing. I suspect at least one loose body in the notch region. No fracture identified. DELFINO CARRENO MD

## 2021-04-14 NOTE — PATIENT INSTRUCTIONS
Patient Education     Knee Pain  Knee pain is very common. It s especially common in active people who put a lot of pressure on their knees, like runners. It affects women more often than men.  Your kneecap (patella) is a thick, round bone. It covers and protects the front portion of your knee joint. It moves along a groove in your thighbone (femur) as part of the patellofemoral joint. A layer of cartilage surrounds the underside of your kneecap. This layer protects it from grinding against your femur.  When this cartilage softens and breaks down, it can cause knee pain. This is partly because of repetitive stress. The stress irritates the lining of the joint. This causes pain in the underlying bone.  What causes knee pain?  Many things can cause knee pain. You may have more than one cause. Some of these include:    Overuse of the knee joint    The kneecap doesn t line up with the tissue around it    Damage to small nerves in the area    Damage to the ligament-like structure that holds the kneecap in place (retinaculum)    Breakdown of the bone under the cartilage    Swelling in the soft tissues around the kneecap    Injury  You might be more likely to have knee pain if you:    Exercise a lot    Recently increased the intensity of your workouts    Have a body mass index (BMI) greater than 25    Have poor alignment of your kneecap    Walk with your feet turned overly outward or inward    Have weakness in surrounding muscle groups (inner quad or hip adductor muscles)    Have too much tightness in surrounding muscle groups (hamstrings or iliotibial band)    Have a recent history of injury to the area    Are female  Symptoms of knee pain  This type of knee pain is a dull, aching pain in the front of the knee in the area under and around the kneecap. This pain may start quickly or slowly. Your pain might be worse when you squat, run, or sit for a long time. Climbing stairs may be painful or hard to do. You might also  sometimes feel like your knee is giving out. You may have symptoms in one or both of your knees.  Diagnosing knee pain  Your healthcare provider will ask about your medical history and your symptoms. Be sure to describe any activities that make your knee pain worse. He or she will look at your knee. This will include tests of your range of motion, strength, and areas of pain of your knee. Your knee alignment will be checked.  Your healthcare provider will need to rule out other causes of your knee pain, such as arthritis. You may need an imaging test, such as an X-ray or MRI.  Treatment for knee pain  Treatments that can help ease your symptoms may include:    Avoiding activities for a while that make your pain worse, returning to activity over time    Icing the outside of your knee when it causes you pain    Taking over-the-counter pain medicine such as NSAIDs    Wearing a knee brace or taping your knee to support it    Compression to help prevent swelling    Wearing special shoe inserts to help keep your feet in the proper alignment    Elevating your knee    Doing special exercises to stretch and strengthen the muscles around your hip and your knee  These steps help most people manage knee pain. But some cases of knee pain need to be treated with surgery. You rarely need surgery right away. You may need it later if other treatments don t work. Your healthcare provider may refer you to an orthopedic surgeon. He or she will talk with you about your choices.  Preventing knee pain  Losing weight and correcting excess muscle tightness or muscle weakness may help lower your risk.  In some cases, you can prevent knee pain. To help prevent a flare-up of knee pain, do these things:    Regularly do all the exercises your doctor or physical therapist advises    Warm up fully before exercising    Support your knee as advised by your doctor or physical therapist    Increase training gradually, and ease up on training when  needed    Have an expert check your gait for running or other sporting activities    Stretch properly before and after exercise    Replace your running shoes regularly    Lose excess weight  When to call your healthcare provider  Call your healthcare provider right away if:    Your symptoms don t get better after a few weeks of treatment    You have any new symptoms  Jay Jay last reviewed this educational content on 6/1/2019 2000-2021 [employer]. All rights reserved. This information is not intended as a substitute for professional medical care. Always follow your healthcare professional's instructions.           Patient Education     Reducing Knee Pain and Swelling    Many treatments can help reduce pain and swelling in your knee. Your healthcare provider or physical therapist may suggest one or more of the following treatments:    Icing your knee.  This helps reduce swelling. You may be asked to ice your knee once a day or more. Apply ice for about 15 to 20 minutes at a time, with at least 40 minutes between sessions. To make an ice pack, put ice cubes in a plastic bag that seals at the top. Wrap the bag in a clean, thin towel or cloth. Never put ice or an ice pack directly on the skin.    Keeping your leg raised above your heart. This helps excess fluid flow out of your knee joint to reduce swelling.    Compression. This means wrapping an elastic bandage or neoprene sleeve snugly around your knees. It keeps fluid from collecting in and around your knee joint.    Electrical stimulation. This is done by a physical therapist or . It can help reduce excess fluid in your knee joint.    Anti-inflammatory medicines. These may be prescribed by your healthcare provider. You may take pills or get shots (injections) in your knee.    Isometric (brenton) exercises. These strengthen the muscles that support your knee joint. They also help reduce excess fluid in your  knee.    Massage. This helps fluid drain away from your knee.  Jay Jay last reviewed this educational content on 5/1/2018 2000-2021 [employer]. All rights reserved. This information is not intended as a substitute for professional medical care. Always follow your healthcare professional's instructions.

## 2021-04-18 ENCOUNTER — HEALTH MAINTENANCE LETTER (OUTPATIENT)
Age: 66
End: 2021-04-18

## 2021-04-22 ENCOUNTER — IMMUNIZATION (OUTPATIENT)
Dept: NURSING | Facility: CLINIC | Age: 66
End: 2021-04-22
Payer: COMMERCIAL

## 2021-04-22 PROCEDURE — 91300 PR COVID VAC PFIZER DIL RECON 30 MCG/0.3 ML IM: CPT

## 2021-04-22 PROCEDURE — 0001A PR COVID VAC PFIZER DIL RECON 30 MCG/0.3 ML IM: CPT

## 2021-05-13 ENCOUNTER — IMMUNIZATION (OUTPATIENT)
Dept: NURSING | Facility: CLINIC | Age: 66
End: 2021-05-13
Attending: INTERNAL MEDICINE
Payer: COMMERCIAL

## 2021-05-13 PROCEDURE — 91300 PR COVID VAC PFIZER DIL RECON 30 MCG/0.3 ML IM: CPT

## 2021-05-13 PROCEDURE — 0002A PR COVID VAC PFIZER DIL RECON 30 MCG/0.3 ML IM: CPT

## 2021-06-01 ENCOUNTER — PATIENT OUTREACH (OUTPATIENT)
Dept: FAMILY MEDICINE | Facility: CLINIC | Age: 66
End: 2021-06-01

## 2021-06-01 NOTE — LETTER
[address] [facility]  [address] [facility] [address] [phone]       June 1, 2021    Debra Jo Ganser  [id number] [facility] [address]    Dear Latha,    We care about your health and have reviewed your health plan and are making recommendations based on this review, to optimize your health.    You are in particular need of attention regarding:  -Breast Cancer Screening    We are recommending that you:                                                                                                                                        -schedule a MAMMOGRAM which is due.         1 in 8 women will develop invasive breast cancer during her lifetime and it is the most common non-skin cancer in American women.  EARLY detection, new treatments, and a better understanding of the disease have increased survival rates - the 5 year survival rate in the 1960s was 63% and today it is close to 90% .      If you are under/uninsured, we recommend you contact the [facility]. They offer mammograms at no charge or on a sliding fee charge. You can schedule with them at [phone]. Please have them send us the results.          Please disregard this reminder if you have had this exam elsewhere within the last year.  It would be helpful for us to have a copy of your mammogram report in our file so that we can best coordinate your care - please contact us with when your test was done so we can update your record.    In addition, here is a list of due or overdue Health Maintenance reminders.    Health Maintenance Due   Topic Date Due     HIV Screening  Never done     Zoster (Shingles) Vaccine (1 of 2) Never done     Mammogram  08/29/2018     Discuss Advance Care Planning  01/19/2021       To address the above recommendations, we encourage you to contact us at [phone], via [employer] or by contacting Central Scheduling toll free at [phone] 24 hours a day. They will assist  you with finding the most convenient time and location.    Thank you for trusting [facility] and we appreciate the opportunity to serve you.  We look forward to supporting your healthcare needs in the future.    Healthy Regards,    Your [facility] Team

## 2021-06-01 NOTE — TELEPHONE ENCOUNTER
Patient Quality Outreach      Summary:    Patient has the following on her problem list/HM:     Depression / Dysthymia review      12 Month Remission: 10-14 month window range: 3/4/2022- 4/5/2022       PHQ-9 SCORE 1/26/2017 9/22/2020 4/14/2021   PHQ-9 Total Score - - -   PHQ-9 Total Score 4 1 3       If PHQ-9 recheck is 5 or more, route to provider for next steps.    Patient is due/failing the following:   Breast Cancer Screening - Mammogram and Immunizations    Type of outreach:    Sent letter.    Questions for provider review:    None                                                                                                                                     Hansa Sanchez [facility]     Chart routed to Care Team.

## 2021-06-29 NOTE — TELEPHONE ENCOUNTER
Patient Quality Outreach 2nd Attempt      Summary:    Type of outreach:    Phone, left message for patient/parent to call back.    Next Steps:  Reach out within 90 days via Letter.    Max number of attempts reached: Yes. Will try again in 90 days if patient still on fail list.    Questions for provider review:    None                                                                                                                    Milli Pierce

## 2021-07-07 ENCOUNTER — PATIENT OUTREACH (OUTPATIENT)
Dept: FAMILY MEDICINE | Facility: CLINIC | Age: 66
End: 2021-07-07

## 2021-07-07 NOTE — LETTER
[address] [facility]  [address] [facility] [address] [phone]       July 7, 2021    Debra Jo Ganser  [address] [address]    Dear Latha,    We care about your health and have reviewed your health plan and are making recommendations based on this review, to optimize your health.    You are in particular need of attention regarding:  -Breast Cancer Screening    We are recommending that you:                                                                                                                                        -schedule a MAMMOGRAM which is due.         1 in 8 women will develop invasive breast cancer during her lifetime and it is the most common non-skin cancer in American women.  EARLY detection, new treatments, and a better understanding of the disease have increased survival rates - the 5 year survival rate in the 1960s was 63% and today it is close to 90% .      If you are under/uninsured, we recommend you contact the [facility]. They offer mammograms at no charge or on a sliding fee charge. You can schedule with them at [phone]. Please have them send us the results.          Please disregard this reminder if you have had this exam elsewhere within the last year.  It would be helpful for us to have a copy of your mammogram report in our file so that we can best coordinate your care - please contact us with when your test was done so we can update your record.    In addition, here is a list of due or overdue Health Maintenance reminders.    Health Maintenance Due   Topic Date Due     HIV Screening  Never done     Zoster (Shingles) Vaccine (1 of 2) Never done     Mammogram  08/29/2018     Discuss Advance Care Planning  01/19/2021       To address the above recommendations, we encourage you to contact us at [phone], via [employer] or by contacting Central Scheduling toll free at [phone] 24 hours a day. They will assist  you with finding the most convenient time and location.    Thank you for trusting [facility] and we appreciate the opportunity to serve you.  We look forward to supporting your healthcare needs in the future.    Healthy Regards,    Your [facility] Team

## 2021-07-07 NOTE — TELEPHONE ENCOUNTER
Patient Quality Outreach      Summary:    Patient has the following on her problem list/HM:     Depression / Dysthymia review    6 Month Remission: 4-8 month window range:   12 Month Remission: 10-14 month window range:        PHQ-9 SCORE 2017   PHQ-9 Total Score - - -   PHQ-9 Total Score 4 1 3       If PHQ-9 recheck is 5 or more, route to provider for next steps.    Diabetes    Last A1C:  Lab Results   Component Value Date    A1C 5.5 2020    A1C 5.5 2017       Last LDL:    Lab Results   Component Value Date     2020       Is the patient on a Statin? No          Is the patient on Aspirin? No        Last three blood pressure readings:  BP Readings from Last 3 Encounters:   21 132/76   20 136/76   17 130/80            Tobacco Use      Smoking status: Former Smoker        Quit date: 2002        Years since quittin.5      Smokeless tobacco: Never Used          Hypertension   Last three blood pressure readings:  BP Readings from Last 3 Encounters:   21 132/76   20 136/76   17 130/80     Blood pressure: Passed    HTN Guidelines:  ? 139/89     Patient is due/failing the following:   Breast Cancer Screening - Mammogram    Type of outreach:    Sent letter.    Questions for provider review:    None                                                                                                                                     Hansa Sanchez CMA     Chart routed to Care Team.

## 2021-10-03 ENCOUNTER — HEALTH MAINTENANCE LETTER (OUTPATIENT)
Age: 66
End: 2021-10-03

## 2021-10-18 DIAGNOSIS — F32.9 REACTIVE DEPRESSION (SITUATIONAL): ICD-10-CM

## 2021-10-18 DIAGNOSIS — E03.9 HYPOTHYROIDISM, UNSPECIFIED TYPE: ICD-10-CM

## 2021-10-21 RX ORDER — BUPROPION HYDROCHLORIDE 300 MG/1
300 TABLET ORAL EVERY MORNING
Qty: 90 TABLET | Refills: 0 | Status: SHIPPED | OUTPATIENT
Start: 2021-10-21 | End: 2021-12-14

## 2021-10-21 RX ORDER — LEVOTHYROXINE SODIUM 88 UG/1
88 TABLET ORAL DAILY
Qty: 90 TABLET | Refills: 0 | Status: SHIPPED | OUTPATIENT
Start: 2021-10-21 | End: 2021-11-22

## 2021-10-21 NOTE — TELEPHONE ENCOUNTER
Patient has an upcoming appointment with Cyndie Moe on 11/22/2021 at 2:00pm.  Will give refill to get patient through.    Holly Corrales RN

## 2021-11-17 ASSESSMENT — ENCOUNTER SYMPTOMS
FEVER: 0
MYALGIAS: 0
CHILLS: 0
HEADACHES: 0
CONSTIPATION: 0
PARESTHESIAS: 0
SORE THROAT: 0
SHORTNESS OF BREATH: 0
DYSURIA: 0
HEMATOCHEZIA: 0
ABDOMINAL PAIN: 0
BREAST MASS: 0
EYE PAIN: 0
NERVOUS/ANXIOUS: 0
PALPITATIONS: 0
ARTHRALGIAS: 1
DIZZINESS: 0
COUGH: 0
FREQUENCY: 1
WEAKNESS: 0
HEARTBURN: 0
NAUSEA: 0
JOINT SWELLING: 0
DIARRHEA: 0
HEMATURIA: 0

## 2021-11-22 ENCOUNTER — OFFICE VISIT (OUTPATIENT)
Dept: FAMILY MEDICINE | Facility: CLINIC | Age: 66
End: 2021-11-22
Payer: COMMERCIAL

## 2021-11-22 VITALS
HEART RATE: 63 BPM | BODY MASS INDEX: 33.94 KG/M2 | SYSTOLIC BLOOD PRESSURE: 120 MMHG | RESPIRATION RATE: 16 BRPM | HEIGHT: 65 IN | WEIGHT: 203.7 LBS | DIASTOLIC BLOOD PRESSURE: 70 MMHG | TEMPERATURE: 98.2 F | OXYGEN SATURATION: 99 %

## 2021-11-22 DIAGNOSIS — E66.811 CLASS 1 OBESITY DUE TO EXCESS CALORIES WITH BODY MASS INDEX (BMI) OF 34.0 TO 34.9 IN ADULT, UNSPECIFIED WHETHER SERIOUS COMORBIDITY PRESENT: ICD-10-CM

## 2021-11-22 DIAGNOSIS — E03.9 HYPOTHYROIDISM, UNSPECIFIED TYPE: ICD-10-CM

## 2021-11-22 DIAGNOSIS — Z23 COVID-19 VACCINE ADMINISTERED: ICD-10-CM

## 2021-11-22 DIAGNOSIS — R73.03 PRE-DIABETES: ICD-10-CM

## 2021-11-22 DIAGNOSIS — Z12.31 ENCOUNTER FOR SCREENING MAMMOGRAM FOR BREAST CANCER: ICD-10-CM

## 2021-11-22 DIAGNOSIS — F32.9 REACTIVE DEPRESSION (SITUATIONAL): ICD-10-CM

## 2021-11-22 DIAGNOSIS — Z11.4 SCREENING FOR HIV (HUMAN IMMUNODEFICIENCY VIRUS): ICD-10-CM

## 2021-11-22 DIAGNOSIS — E66.09 CLASS 1 OBESITY DUE TO EXCESS CALORIES WITH BODY MASS INDEX (BMI) OF 34.0 TO 34.9 IN ADULT, UNSPECIFIED WHETHER SERIOUS COMORBIDITY PRESENT: ICD-10-CM

## 2021-11-22 DIAGNOSIS — Z00.00 ROUTINE GENERAL MEDICAL EXAMINATION AT A HEALTH CARE FACILITY: Primary | ICD-10-CM

## 2021-11-22 PROBLEM — E66.01 MORBID OBESITY (H): Status: RESOLVED | Noted: 2020-09-22 | Resolved: 2021-11-22

## 2021-11-22 LAB — HBA1C MFR BLD: 5.4 % (ref 0–5.6)

## 2021-11-22 PROCEDURE — 90682 RIV4 VACC RECOMBINANT DNA IM: CPT | Performed by: PHYSICIAN ASSISTANT

## 2021-11-22 PROCEDURE — 80053 COMPREHEN METABOLIC PANEL: CPT | Performed by: PHYSICIAN ASSISTANT

## 2021-11-22 PROCEDURE — 83036 HEMOGLOBIN GLYCOSYLATED A1C: CPT | Performed by: PHYSICIAN ASSISTANT

## 2021-11-22 PROCEDURE — 0004A COVID-19,PF,PFIZER (12+ YRS): CPT | Performed by: PHYSICIAN ASSISTANT

## 2021-11-22 PROCEDURE — 80061 LIPID PANEL: CPT | Performed by: PHYSICIAN ASSISTANT

## 2021-11-22 PROCEDURE — 84443 ASSAY THYROID STIM HORMONE: CPT | Performed by: PHYSICIAN ASSISTANT

## 2021-11-22 PROCEDURE — 36415 COLL VENOUS BLD VENIPUNCTURE: CPT | Performed by: PHYSICIAN ASSISTANT

## 2021-11-22 PROCEDURE — 99214 OFFICE O/P EST MOD 30 MIN: CPT | Mod: 25 | Performed by: PHYSICIAN ASSISTANT

## 2021-11-22 PROCEDURE — 91300 COVID-19,PF,PFIZER (12+ YRS): CPT | Performed by: PHYSICIAN ASSISTANT

## 2021-11-22 PROCEDURE — 90471 IMMUNIZATION ADMIN: CPT | Performed by: PHYSICIAN ASSISTANT

## 2021-11-22 PROCEDURE — 99396 PREV VISIT EST AGE 40-64: CPT | Mod: 25 | Performed by: PHYSICIAN ASSISTANT

## 2021-11-22 RX ORDER — BUPROPION HYDROCHLORIDE 150 MG/1
150 TABLET ORAL EVERY MORNING
Qty: 90 TABLET | Refills: 3 | Status: SHIPPED | OUTPATIENT
Start: 2021-11-22 | End: 2023-03-08

## 2021-11-22 RX ORDER — BUPROPION HYDROCHLORIDE 300 MG/1
300 TABLET ORAL EVERY MORNING
Qty: 90 TABLET | Refills: 1 | Status: CANCELLED | OUTPATIENT
Start: 2021-11-22

## 2021-11-22 RX ORDER — MULTIVIT-MIN/IRON/FOLIC ACID/K 18-600-40
CAPSULE ORAL
COMMUNITY
End: 2021-12-14

## 2021-11-22 RX ORDER — LEVOTHYROXINE SODIUM 88 UG/1
88 TABLET ORAL DAILY
Qty: 90 TABLET | Refills: 3 | Status: SHIPPED | OUTPATIENT
Start: 2021-11-22 | End: 2022-11-23

## 2021-11-22 ASSESSMENT — ENCOUNTER SYMPTOMS
PARESTHESIAS: 0
WEAKNESS: 0
HEMATOCHEZIA: 0
DIZZINESS: 0
FREQUENCY: 1
ARTHRALGIAS: 1
COUGH: 0
SHORTNESS OF BREATH: 0
CONSTIPATION: 0
DIARRHEA: 0
SORE THROAT: 0
ABDOMINAL PAIN: 0
FEVER: 0
NAUSEA: 0
EYE PAIN: 0
HEMATURIA: 0
CHILLS: 0
BREAST MASS: 0
NERVOUS/ANXIOUS: 0
HEADACHES: 0
PALPITATIONS: 0
DYSURIA: 0
MYALGIAS: 0
JOINT SWELLING: 0
HEARTBURN: 0

## 2021-11-22 ASSESSMENT — ANXIETY QUESTIONNAIRES
7. FEELING AFRAID AS IF SOMETHING AWFUL MIGHT HAPPEN: NOT AT ALL
GAD7 TOTAL SCORE: 0
2. NOT BEING ABLE TO STOP OR CONTROL WORRYING: NOT AT ALL
6. BECOMING EASILY ANNOYED OR IRRITABLE: NOT AT ALL
3. WORRYING TOO MUCH ABOUT DIFFERENT THINGS: NOT AT ALL
5. BEING SO RESTLESS THAT IT IS HARD TO SIT STILL: NOT AT ALL
1. FEELING NERVOUS, ANXIOUS, OR ON EDGE: NOT AT ALL

## 2021-11-22 ASSESSMENT — PAIN SCALES - GENERAL: PAINLEVEL: NO PAIN (0)

## 2021-11-22 ASSESSMENT — PATIENT HEALTH QUESTIONNAIRE - PHQ9: 5. POOR APPETITE OR OVEREATING: NOT AT ALL

## 2021-11-22 ASSESSMENT — MIFFLIN-ST. JEOR: SCORE: 1466.92
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
## 2021-11-23 LAB
ALBUMIN SERPL-MCNC: 3.8 G/DL (ref 3.4–5)
ALP SERPL-CCNC: 82 U/L (ref 40–150)
ALT SERPL W P-5'-P-CCNC: 29 U/L (ref 0–50)
ANION GAP SERPL CALCULATED.3IONS-SCNC: 2 MMOL/L (ref 3–14)
AST SERPL W P-5'-P-CCNC: 18 U/L (ref 0–45)
BILIRUB SERPL-MCNC: 0.3 MG/DL (ref 0.2–1.3)
BUN SERPL-MCNC: 13 MG/DL (ref 7–30)
CALCIUM SERPL-MCNC: 9 MG/DL (ref 8.5–10.1)
CHLORIDE BLD-SCNC: 106 MMOL/L (ref 94–109)
CHOLEST SERPL-MCNC: 246 MG/DL
CO2 SERPL-SCNC: 30 MMOL/L (ref 20–32)
CREAT SERPL-MCNC: 0.81 MG/DL (ref 0.52–1.04)
FASTING STATUS PATIENT QL REPORTED: NO
GFR SERPL CREATININE-BSD FRML MDRD: 77 ML/MIN/1.73M2
GLUCOSE BLD-MCNC: 84 MG/DL (ref 70–99)
HDLC SERPL-MCNC: 54 MG/DL
LDLC SERPL CALC-MCNC: 148 MG/DL
NONHDLC SERPL-MCNC: 192 MG/DL
POTASSIUM BLD-SCNC: 4.1 MMOL/L (ref 3.4–5.3)
PROT SERPL-MCNC: 7.4 G/DL (ref 6.8–8.8)
SODIUM SERPL-SCNC: 138 MMOL/L (ref 133–144)
TRIGL SERPL-MCNC: 218 MG/DL
TSH SERPL DL<=0.005 MIU/L-ACNC: 0.85 MU/L (ref 0.4–4)

## 2021-11-23 ASSESSMENT — PATIENT HEALTH QUESTIONNAIRE - PHQ9: SUM OF ALL RESPONSES TO PHQ QUESTIONS 1-9: 0

## 2021-11-23 ASSESSMENT — ANXIETY QUESTIONNAIRES: GAD7 TOTAL SCORE: 0

## 2021-12-14 ENCOUNTER — OFFICE VISIT (OUTPATIENT)
Dept: URGENT CARE | Facility: URGENT CARE | Age: 66
End: 2021-12-14
Payer: COMMERCIAL

## 2021-12-14 ENCOUNTER — NURSE TRIAGE (OUTPATIENT)
Dept: NURSING | Facility: CLINIC | Age: 66
End: 2021-12-14
Payer: COMMERCIAL

## 2021-12-14 VITALS
DIASTOLIC BLOOD PRESSURE: 77 MMHG | OXYGEN SATURATION: 100 % | TEMPERATURE: 98.7 F | HEART RATE: 72 BPM | RESPIRATION RATE: 16 BRPM | SYSTOLIC BLOOD PRESSURE: 159 MMHG

## 2021-12-14 DIAGNOSIS — R13.10 DYSPHAGIA, UNSPECIFIED TYPE: Primary | ICD-10-CM

## 2021-12-14 PROCEDURE — 99213 OFFICE O/P EST LOW 20 MIN: CPT | Performed by: PHYSICIAN ASSISTANT

## 2021-12-14 NOTE — TELEPHONE ENCOUNTER
"Triage:    Pt called reporting a new onset of pain \"frequently\" in esophagus with swallowing.  Pt describes pain as feeling irritated and is located in the \"front chest area just below level of shoulder blades\".  Pt does not feel like any food is stuck in esophagus.  Pt reports being able to swallow but taking smaller bites and sips which sometimes helps.  Pt denies wheezing, difficulty breathing or hoarseness.  Pt denies fever and reports adequate oral intake.  Pt denies coughing related to eating.     According to the protocol, patient should see physician within 24 hours.  Care advice given. Patient verbalizes understanding and agrees with plan of care. Transferred to scheduling.  Clinic appointment not available in time frame pt needed and [facility].      Nasreen Ashraf RN  12/14/21 11:36 AM  [facility] Nurse Advisor    COVID 19 Nurse Triage Plan/Patient Instructions    Please be aware that novel coronavirus (COVID-19) may be circulating in the community. If you develop symptoms such as fever, cough, or SOB or if you have concerns about the presence of another infection including coronavirus (COVID-19), please contact your health care provider or visit [email or web address].     Disposition/Instructions    In-Person Visit with provider recommended. Reference Visit Selection Guide.    Thank you for taking steps to prevent the spread of this virus.  o Limit your contact with others.  o Wear a simple mask to cover your cough.  o Wash your hands well and often.    Resources    [facility]: About COVID-19: [email or web address]    CDC: What to Do If You're Sick: www.cdc.gov/coronavirus/2019-ncov/about/steps-when-sick.html    CDC: Ending Home Isolation: www.cdc.gov/coronavirus/2019-ncov/hcp/disposition-in-home-patients.html     CDC: Caring for Someone: www.cdc.gov/coronavirus/2019-ncov/if-you-are-sick/care-for-someone.html     [facility]: Interim Guidance for Hospital Discharge to " Home: www.health.[facility].[place]/diseases/coronavirus/hcp/hospdischarge.pdf    [facility] clinical trials (COVID-19 research studies): [email or web address]     Below are the COVID-19 hotlines at the [place] Department of [facility] ([facility]). Interpreters are available.   o For health questions: Call 884-469-1865 or 1-219.339.5650 (7 a.m. to 7 p.m.)  o For questions about schools and childcare: Call 530-868-1584 or 1-315.397.1375 (7 a.m. to 7 p.m.)         Reason for Disposition    [1] Swallowing difficulty AND [2] cause unknown (Exception: difficulty swallowing is a chronic symptom)    Additional Information    Negative: [1] Severe difficulty swallowing (e.g., drooling or spitting) AND [2] started suddenly after taking a medicine or allergic food    Negative: Wheezing, stridor, hoarseness, or difficulty breathing    Negative: [1] Swollen tongue AND [2] sudden onset    Negative: Sounds like a life-threatening emergency to the triager    Negative: Mouth ulcers are seen    Negative: Sore throat (throat pain with swallowing)    Negative: Swallowed a (non-edible) foreign body    Negative: Feeding tube, questions or concerns related to    Negative: Swelling of tongue    Negative: SEVERE difficulty swallowing (e.g., drooling or spitting, can't swallow water)    Negative: [1] Symptoms of blocked esophagus (e.g., can't swallow normal secretions, drooling) AND [2] present now    Negative: Symptoms of food or bone stuck in throat or esophagus (e.g., pain in throat or chest, FB sensation, blood-tinged saliva)    Negative: SEVERE symptoms of pill stuck in throat or esophagus (e.g., severe pain, bleeding, or inability to swallow liquids)    Negative: [1] Drinking very little AND [2] dehydration suspected (e.g., no urine > 12 hours, very dry mouth, very lightheaded)    Negative: [1] Refuses to drink anything AND [2] for > 12 hours    Negative: Patient sounds very sick or weak to the triager    Negative:  Fever > 100.4 F (38.0 C)    Negative: [1] Coughing spells AND [2] occur during eating/feedings or within 2 hours    Negative: Weak immune system (e.g., HIV positive, cancer chemo, splenectomy, organ transplant, chronic steroids)    Negative: [1] Symptoms of pill stuck in throat or esophagus (e.g., pain in throat or chest, FB sensation) AND [2] no relief after using CARE ADVICE    Protocols used: SWALLOWING DIFFICULTY-A-AH

## 2021-12-15 NOTE — PROGRESS NOTES
Assessment/Plan:    Advised pt f/u with GI for further evaluation; likely needs EGD. Differential diagnosis includes GERD, esophagitis, esophageal rings/webs, neoplasm.  See patient instructions below.    At the end of the encounter, I discussed results, diagnosis, medications. Discussed red flags for immediate return to clinic/ER, as well as indications for follow up if no improvement. Patient understood and agreed to plan. Patient was stable for discharge.      ICD-10-CM    1. Dysphagia, unspecified type  R13.10 Adult Gastro Ref - Consult Only         Return in about 1 week (around 12/21/2021) for Follow up with GI.    CHENTE Zimmerman, PA-[facility]  -----------------------------------------------------------------------------------------------------------------------------------------------------    HPI:  Debra Jo Ganser is a 66 year old female who presents for evaluation of pressure in her chest when swallowing onset 2-3 months ago. This occurs at least once daily but not every time she eats/drinks; it occurs with both liquids & solids. She states it is is not difficult to swallow, she just feels a pressure when she does. No chest pressure otherwise, or heartburn. No choking episodes or regurgitation of food. No treatments tried. Patient reports no fever/chills, weight loss, night sweats, headache, shortness of breath, abdominal pain, nausea, vomiting, diarrhea, or any other symptoms.     Past Medical History:   Diagnosis Date     Hypothyroidism        Vitals:    12/14/21 1753 12/14/21 1757   BP: (!) 156/84 (!) 159/77   Pulse: 72    Resp: 16    Temp: 98.7  F (37.1  C)    TempSrc: Oral    SpO2: 100%        Physical Exam  Vitals and nursing note reviewed.   Cardiovascular:      Rate and Rhythm: Normal rate and regular rhythm.      Heart sounds: Normal heart sounds.   Pulmonary:      Effort: Pulmonary effort is normal.      Breath sounds: Normal breath sounds.   Neurological:       Mental Status: She is alert.         Labs/Imaging:  No results found for this or any previous visit (from the past 24 hour(s)).      Patient Instructions   Patient Education     Dysphagia (Adult)    Dysphagia is trouble swallowing. If you have dysphagia, you may have symptoms that include:    Choking or coughing when you eat or drink    Food getting stuck    Drooling    Inability to swallow    Pain behind the breastbone after swallowing    Vomiting after you eat or drink    Aspirating (inhaling into the lungs) foods or liquids when you swallow  The main causes of dysphagia are problems that affect the mouth, throat or tongue. Dysphagia may be caused by a problem with the esophagus (tube that carries food from the mouth to the stomach). These include blockage, swelling, or irritation from acid reflux or injury. An infection of the esophagus or an allergic reaction in the esophagus can also cause dysphagia. Problems in the brain, such as a stroke or Parkinson's disease, can affect the muscles that coordinate swallowing.  Dysphagia is treated by treating the cause. Your healthcare provider may evaluate you using X-ray, special esophagus monitors, a fiber optic evaluation of swallowing, or an upper endoscopy, which uses a thin, lighted tube sent through the mouth to the esophagus. You may be given medicine to reduce stomach acid or control muscle spasms. If the problem doesn't go away, a procedure can be done to widen (dilate) the esophagus. If you have muscle or nerve problems, you may be advised to see a speech or occupational therapist. He or she may give you exercises and instructions to help make eating safer. If you have an infection or allergic condition, your healthcare provide will prescribe medicine for it.  Home care  To help ease the symptoms of dysphagia:    Take any medicine you ve been given exactly as directed. Ask for liquid medicines if you need them.    To make eating easier:  ? Eat  slowly.   ? Eat in a relaxed setting.  ? Don t talk while you eat.  ? Take small bites. Chew slowly and completely before you swallow.  ? Sit upright during and after meals. Chewing food releases enzymes in your mouth that start the digestive process. Chew soft foods at least 5 to10 times. Chew more dense food (meats and vegetables) up to 30 times before swallowing. Count the number of times you chew until you get a sense of how soft the food needs to be before swallowing.  ? Don't eat dry bread products or meat fibers.  ? Puree solid foods if needed. Thicken liquids with milk, juice, broth, gravy, or starch to make them easier to swallow.  ? Ask your healthcare provider if a liquid diet may be better for you.  Follow-up care  Follow up with your healthcare provider or as directed. Your healthcare provider can give you information about tests you may need.   When to seek medical advice  Call your healthcare provider right away for any of the following:    Inability to keep down food or liquid    Symptoms that get worse quickly    Coughing that won't stop    Continuing to lose weight    Fever of 100.4 F (38 C) or higher, or as directed by your healthcare provider    Other symptoms as indicated by your healthcare provider  Call 911  Call 911 for any of the following:    Trouble breathing    Inability to talk    Drooling, inability to control secretions    Loss of consciousness  Jay Jay last reviewed this educational content on 3/1/2018    7188-7152 [employer]. All rights reserved. This information is not intended as a substitute for professional medical care. Always follow your healthcare professional's instructions.

## 2021-12-15 NOTE — PATIENT INSTRUCTIONS
Patient Education     Dysphagia (Adult)    Dysphagia is trouble swallowing. If you have dysphagia, you may have symptoms that include:    Choking or coughing when you eat or drink    Food getting stuck    Drooling    Inability to swallow    Pain behind the breastbone after swallowing    Vomiting after you eat or drink    Aspirating (inhaling into the lungs) foods or liquids when you swallow  The main causes of dysphagia are problems that affect the mouth, throat or tongue. Dysphagia may be caused by a problem with the esophagus (tube that carries food from the mouth to the stomach). These include blockage, swelling, or irritation from acid reflux or injury. An infection of the esophagus or an allergic reaction in the esophagus can also cause dysphagia. Problems in the brain, such as a stroke or Parkinson's disease, can affect the muscles that coordinate swallowing.  Dysphagia is treated by treating the cause. Your healthcare provider may evaluate you using X-ray, special esophagus monitors, a fiber optic evaluation of swallowing, or an upper endoscopy, which uses a thin, lighted tube sent through the mouth to the esophagus. You may be given medicine to reduce stomach acid or control muscle spasms. If the problem doesn't go away, a procedure can be done to widen (dilate) the esophagus. If you have muscle or nerve problems, you may be advised to see a speech or occupational therapist. He or she may give you exercises and instructions to help make eating safer. If you have an infection or allergic condition, your healthcare provide will prescribe medicine for it.  Home care  To help ease the symptoms of dysphagia:    Take any medicine you ve been given exactly as directed. Ask for liquid medicines if you need them.    To make eating easier:  ? Eat slowly.   ? Eat in a relaxed setting.  ? Don t talk while you eat.  ? Take small bites. Chew slowly and completely before you swallow.  ? Sit upright during and after meals.  Chewing food releases enzymes in your mouth that start the digestive process. Chew soft foods at least 5 to10 times. Chew more dense food (meats and vegetables) up to 30 times before swallowing. Count the number of times you chew until you get a sense of how soft the food needs to be before swallowing.  ? Don't eat dry bread products or meat fibers.  ? Puree solid foods if needed. Thicken liquids with milk, juice, broth, gravy, or starch to make them easier to swallow.  ? Ask your healthcare provider if a liquid diet may be better for you.  Follow-up care  Follow up with your healthcare provider or as directed. Your healthcare provider can give you information about tests you may need.   When to seek medical advice  Call your healthcare provider right away for any of the following:    Inability to keep down food or liquid    Symptoms that get worse quickly    Coughing that won't stop    Continuing to lose weight    Fever of 100.4 F (38 C) or higher, or as directed by your healthcare provider    Other symptoms as indicated by your healthcare provider  Call 911  Call 911 for any of the following:    Trouble breathing    Inability to talk    Drooling, inability to control secretions    Loss of consciousness  Jay Jay last reviewed this educational content on 3/1/2018    2309-3114 [employer]. All rights reserved. This information is not intended as a substitute for professional medical care. Always follow your healthcare professional's instructions.

## 2021-12-16 ENCOUNTER — TELEPHONE (OUTPATIENT)
Dept: FAMILY MEDICINE | Facility: CLINIC | Age: 66
End: 2021-12-16
Payer: COMMERCIAL

## 2021-12-16 DIAGNOSIS — R13.10 DYSPHAGIA, UNSPECIFIED TYPE: Primary | ICD-10-CM

## 2021-12-16 NOTE — TELEPHONE ENCOUNTER
Patient needs referral for an Upper Endoscopy, see UC notes from 12/14/21. [facility] recommended that PCP place orders for procedure rather than consult so patient can have this done sooner. Please call in referral to [facility] when completed at [phone].  -Milli Pierce

## 2021-12-20 ENCOUNTER — TRANSFERRED RECORDS (OUTPATIENT)
Dept: HEALTH INFORMATION MANAGEMENT | Facility: CLINIC | Age: 66
End: 2021-12-20
Payer: COMMERCIAL

## 2021-12-30 ENCOUNTER — TELEPHONE (OUTPATIENT)
Dept: FAMILY MEDICINE | Facility: CLINIC | Age: 66
End: 2021-12-30
Payer: COMMERCIAL

## 2021-12-30 NOTE — TELEPHONE ENCOUNTER
Patient Quality Outreach    Patient is due for the following:   Colon Cancer Screening -  Colonoscopy  Breast Cancer Screening - Mammogram  Immunizations  -  Zoster    NEXT STEPS:   No follow up needed at this time.    Type of outreach:    Sent [employer] message.      Questions for provider review:    None     Obinna Shea MA

## 2021-12-30 NOTE — LETTER
[facility]  [address] [address] [address] [phone]       January 13, 2022    Debra Jo Ganser  [address] [place] [id number]    Dear Latha,    We care about your health and have reviewed your health plan and are making recommendations based on this review, to optimize your health.    You are in particular need of attention regarding:  -Breast Cancer Screening  -Colon Cancer Screening    We are recommending that you:  -schedule a MAMMOGRAM which is due.    1 in 8 women will develop invasive breast cancer during her lifetime and it is the most common non-skin cancer in American women.  EARLY detection, new treatments, and a better understanding of the disease have increased survival rates - the 5 year survival rate in the 1960s was 63% and today it is close to 90%.    If you are under/uninsured, we recommend you contact the [employer] Program. They offer mammograms at no charge or on a sliding fee charge. You can schedule with them at [phone]. Please have them send us the results.      Please disregard this reminder if you have had this exam elsewhere within the last year.  It would be helpful for us to have a copy of your mammogram report in your file so that we can best coordinate your care - please contact us with when your test was done so we can update your record.             -schedule a COLONOSCOPY to look for colon cancer (due every 10 years or 5 years in higher risk situations.)        Colon cancer is now the second leading cause of cancer-related deaths in the [place] for both men and women and there are over 130,000 new cases and 50,000 deaths per year from colon cancer.  Colonoscopies can prevent 90-95% of these deaths.  Problem lesions can be removed before they ever become cancer.  This test is not only looking for cancer, but also getting rid of precancerious lesions.    If you are under/uninsured, we recommend you contact the [employer] program. [employer] is a free colorectal cancer screening program that provides colonoscopies for eligible under/uninsured [place] men and women. If you are interested in receiving a free colonoscopy, please call [employer] at [phone] (mention code [employer]) to see if you re eligible.      If you do not wish to do a colonoscopy or cannot afford to do one, at this time, there is another option. It is called a FIT test or Fecal Immunochemical Occult Blood Test (take home stool sample kit).  It does not replace the colonoscopy for colorectal cancer screening, but it can detect hidden bleeding in the lower colon.  It does need to be repeated every year and if a positive result is obtained, you would be referred for a colonoscopy.          If you have completed either one of these tests at another facility, please call with the details of when and where the tests were done and if they were normal or not. Or have the records sent to our clinic so that we can best coordinate your care.      In addition, here is a list of due or overdue Health Maintenance reminders.    Health Maintenance Due   Topic Date Due     Zoster (Shingles) Vaccine (1 of 2) Never done     Mammogram  08/29/2018       To address the above recommendations, we encourage you to contact us at [phone], via [employer] or by contacting Central Scheduling toll free at [phone] 24 hours a day. They will assist you with finding the most convenient time and location.    Thank you for trusting [facility] and we appreciate the opportunity to serve you.  We look forward to supporting your healthcare needs in the future.    Healthy Regards,    Your [facility] Team

## 2022-01-13 NOTE — TELEPHONE ENCOUNTER
Patient Quality Outreach    Patient is due for the following:   Colon Cancer Screening -  Colonoscopy  Breast Cancer Screening - Mammogram  Immunizations  -  Zoster    NEXT STEPS:   No follow up needed at this time.    Type of outreach:    Sent letter.    Next Steps:  Reach out within 90 days via Phone.    Max number of attempts reached: Yes. Will try again in 90 days if patient still on fail list.    Questions for provider review:    None     Obinna Shea MA

## 2022-03-24 ENCOUNTER — TRANSFERRED RECORDS (OUTPATIENT)
Dept: MULTI SPECIALTY CLINIC | Facility: CLINIC | Age: 67
End: 2022-03-24
Payer: COMMERCIAL

## 2022-03-25 ENCOUNTER — ANCILLARY PROCEDURE (OUTPATIENT)
Dept: MAMMOGRAPHY | Facility: CLINIC | Age: 67
End: 2022-03-25
Attending: PHYSICIAN ASSISTANT
Payer: COMMERCIAL

## 2022-03-25 DIAGNOSIS — Z12.31 VISIT FOR SCREENING MAMMOGRAM: ICD-10-CM

## 2022-03-25 PROCEDURE — 77067 SCR MAMMO BI INCL CAD: CPT | Mod: TC | Performed by: RADIOLOGY

## 2022-03-25 PROCEDURE — 77063 BREAST TOMOSYNTHESIS BI: CPT | Mod: TC | Performed by: RADIOLOGY

## 2022-09-10 ENCOUNTER — HEALTH MAINTENANCE LETTER (OUTPATIENT)
Age: 67
End: 2022-09-10

## 2022-11-22 DIAGNOSIS — E03.9 HYPOTHYROIDISM, UNSPECIFIED TYPE: ICD-10-CM

## 2022-11-23 RX ORDER — LEVOTHYROXINE SODIUM 88 UG/1
TABLET ORAL
Qty: 90 TABLET | Refills: 0 | Status: SHIPPED | OUTPATIENT
Start: 2022-11-23 | End: 2023-02-23

## 2022-11-23 NOTE — TELEPHONE ENCOUNTER
Prescription approved per [facility] Refill Protocol.  Braydon ACKERMAN RN 11/23/2022 at 7:44 AM

## 2023-01-21 ENCOUNTER — HEALTH MAINTENANCE LETTER (OUTPATIENT)
Age: 68
End: 2023-01-21

## 2023-02-22 DIAGNOSIS — E03.9 HYPOTHYROIDISM, UNSPECIFIED TYPE: ICD-10-CM

## 2023-02-23 RX ORDER — LEVOTHYROXINE SODIUM 88 UG/1
TABLET ORAL
Qty: 90 TABLET | Refills: 0 | Status: SHIPPED | OUTPATIENT
Start: 2023-02-23 | End: 2023-03-29

## 2023-02-23 NOTE — TELEPHONE ENCOUNTER
Helen refill provided. Patient needs updated lab (TSH) completed prior to further refills.     TSH   Date Value Ref Range Status   11/22/2021 0.85 0.40 - 4.00 mU/L Final   09/22/2020 0.88 0.40 - 4.00 mU/L Final     Routing to Cyndie Moe PA-C to order labs for further refills.   - Patient has an upcoming appointment scheduled with Cyndie Moe PA-C on 3/29/2023.     Helen LOMAX RN   [facility]

## 2023-03-07 DIAGNOSIS — F32.9 REACTIVE DEPRESSION (SITUATIONAL): ICD-10-CM

## 2023-03-08 RX ORDER — BUPROPION HYDROCHLORIDE 150 MG/1
TABLET ORAL
Qty: 30 TABLET | Refills: 0 | Status: SHIPPED | OUTPATIENT
Start: 2023-03-08 | End: 2023-03-29

## 2023-03-08 NOTE — TELEPHONE ENCOUNTER
Routing refill request to provider for review/approval because:  A break in medication  Patient needs to be seen because it has been more than 1 year since last office visit.  Outdated PHQ-9  Future Appointments 3/7/2023 - 9/3/2023        Date Visit Type Length Department Provider     3/29/2023 11:00 AM ANNUAL WELLNESS 30 min RM Cyndie Arita, YOCASTA    Location Instructions:     [facility] is located at [address] [address], near [address]. This is a half mile west of [address] [address]. If traveling west on [address], turn south onto [address], then west onto [address] to reach the parking lot. If traveling east on [address], turn south directly onto [address].                     Mitra Ramirez RN, BSN  [facility]

## 2023-03-22 ASSESSMENT — ENCOUNTER SYMPTOMS
ARTHRALGIAS: 0
DIZZINESS: 0
MYALGIAS: 0
PARESTHESIAS: 0
CONSTIPATION: 0
BREAST MASS: 0
SORE THROAT: 0
NERVOUS/ANXIOUS: 0
HEMATURIA: 0
CHILLS: 0
PALPITATIONS: 0
JOINT SWELLING: 0
WEAKNESS: 0
NAUSEA: 0
DIARRHEA: 1
FREQUENCY: 1
HEARTBURN: 0
HEADACHES: 0
ABDOMINAL PAIN: 0
HEMATOCHEZIA: 0
EYE PAIN: 0
DYSURIA: 0
FEVER: 0
COUGH: 0
SHORTNESS OF BREATH: 0

## 2023-03-22 ASSESSMENT — ACTIVITIES OF DAILY LIVING (ADL): CURRENT_FUNCTION: NO ASSISTANCE NEEDED

## 2023-03-29 ENCOUNTER — OFFICE VISIT (OUTPATIENT)
Dept: FAMILY MEDICINE | Facility: CLINIC | Age: 68
End: 2023-03-29
Payer: COMMERCIAL

## 2023-03-29 VITALS
HEART RATE: 84 BPM | OXYGEN SATURATION: 96 % | DIASTOLIC BLOOD PRESSURE: 80 MMHG | HEIGHT: 64 IN | RESPIRATION RATE: 16 BRPM | SYSTOLIC BLOOD PRESSURE: 136 MMHG | TEMPERATURE: 98.3 F | BODY MASS INDEX: 36.55 KG/M2 | WEIGHT: 214.1 LBS

## 2023-03-29 DIAGNOSIS — Z00.00 WELCOME TO MEDICARE PREVENTIVE VISIT: Primary | ICD-10-CM

## 2023-03-29 DIAGNOSIS — E03.9 HYPOTHYROIDISM, UNSPECIFIED TYPE: ICD-10-CM

## 2023-03-29 DIAGNOSIS — R19.7 DIARRHEA, UNSPECIFIED TYPE: ICD-10-CM

## 2023-03-29 DIAGNOSIS — Z78.0 POST-MENOPAUSAL: ICD-10-CM

## 2023-03-29 DIAGNOSIS — L98.9 SKIN LESION: ICD-10-CM

## 2023-03-29 DIAGNOSIS — R73.03 PRE-DIABETES: ICD-10-CM

## 2023-03-29 DIAGNOSIS — E66.01 MORBID OBESITY (H): ICD-10-CM

## 2023-03-29 DIAGNOSIS — Z13.220 LIPID SCREENING: ICD-10-CM

## 2023-03-29 DIAGNOSIS — F32.9 REACTIVE DEPRESSION (SITUATIONAL): ICD-10-CM

## 2023-03-29 LAB
ANION GAP SERPL CALCULATED.3IONS-SCNC: 10 MMOL/L (ref 7–15)
BUN SERPL-MCNC: 14.8 MG/DL (ref 8–23)
CALCIUM SERPL-MCNC: 9.7 MG/DL (ref 8.8–10.2)
CHLORIDE SERPL-SCNC: 104 MMOL/L (ref 98–107)
CHOLEST SERPL-MCNC: 233 MG/DL
CREAT SERPL-MCNC: 0.93 MG/DL (ref 0.51–0.95)
DEPRECATED HCO3 PLAS-SCNC: 27 MMOL/L (ref 22–29)
ERYTHROCYTE [DISTWIDTH] IN BLOOD BY AUTOMATED COUNT: 12.8 % (ref 10–15)
GFR SERPL CREATININE-BSD FRML MDRD: 68 ML/MIN/1.73M2
GLUCOSE SERPL-MCNC: 96 MG/DL (ref 70–99)
HBA1C MFR BLD: 5.4 % (ref 0–5.6)
HCT VFR BLD AUTO: 41.7 % (ref 35–47)
HDLC SERPL-MCNC: 63 MG/DL
HGB BLD-MCNC: 13.4 G/DL (ref 11.7–15.7)
LDLC SERPL CALC-MCNC: 141 MG/DL
MCH RBC QN AUTO: 28.4 PG (ref 26.5–33)
MCHC RBC AUTO-ENTMCNC: 32.1 G/DL (ref 31.5–36.5)
MCV RBC AUTO: 88 FL (ref 78–100)
NONHDLC SERPL-MCNC: 170 MG/DL
PLATELET # BLD AUTO: 214 10E3/UL (ref 150–450)
POTASSIUM SERPL-SCNC: 4.6 MMOL/L (ref 3.4–5.3)
RBC # BLD AUTO: 4.72 10E6/UL (ref 3.8–5.2)
SODIUM SERPL-SCNC: 141 MMOL/L (ref 136–145)
TRIGL SERPL-MCNC: 145 MG/DL
TSH SERPL DL<=0.005 MIU/L-ACNC: 1.05 UIU/ML (ref 0.3–4.2)
WBC # BLD AUTO: 5.2 10E3/UL (ref 4–11)

## 2023-03-29 PROCEDURE — 85027 COMPLETE CBC AUTOMATED: CPT | Performed by: PHYSICIAN ASSISTANT

## 2023-03-29 PROCEDURE — 83036 HEMOGLOBIN GLYCOSYLATED A1C: CPT | Performed by: PHYSICIAN ASSISTANT

## 2023-03-29 PROCEDURE — 90677 PCV20 VACCINE IM: CPT | Performed by: PHYSICIAN ASSISTANT

## 2023-03-29 PROCEDURE — G0402 INITIAL PREVENTIVE EXAM: HCPCS | Performed by: PHYSICIAN ASSISTANT

## 2023-03-29 PROCEDURE — 82784 ASSAY IGA/IGD/IGG/IGM EACH: CPT | Performed by: PHYSICIAN ASSISTANT

## 2023-03-29 PROCEDURE — 99214 OFFICE O/P EST MOD 30 MIN: CPT | Mod: 25 | Performed by: PHYSICIAN ASSISTANT

## 2023-03-29 PROCEDURE — 84443 ASSAY THYROID STIM HORMONE: CPT | Performed by: PHYSICIAN ASSISTANT

## 2023-03-29 PROCEDURE — 36415 COLL VENOUS BLD VENIPUNCTURE: CPT | Performed by: PHYSICIAN ASSISTANT

## 2023-03-29 PROCEDURE — 80061 LIPID PANEL: CPT | Performed by: PHYSICIAN ASSISTANT

## 2023-03-29 PROCEDURE — G0009 ADMIN PNEUMOCOCCAL VACCINE: HCPCS | Performed by: PHYSICIAN ASSISTANT

## 2023-03-29 PROCEDURE — 86364 TISS TRNSGLTMNASE EA IG CLAS: CPT | Performed by: PHYSICIAN ASSISTANT

## 2023-03-29 PROCEDURE — 80048 BASIC METABOLIC PNL TOTAL CA: CPT | Performed by: PHYSICIAN ASSISTANT

## 2023-03-29 RX ORDER — BUPROPION HYDROCHLORIDE 150 MG/1
150 TABLET ORAL DAILY
Qty: 90 TABLET | Refills: 3 | Status: SHIPPED | OUTPATIENT
Start: 2023-03-29 | End: 2024-04-08

## 2023-03-29 RX ORDER — LEVOTHYROXINE SODIUM 88 UG/1
88 TABLET ORAL DAILY
Qty: 90 TABLET | Refills: 3 | Status: SHIPPED | OUTPATIENT
Start: 2023-03-29 | End: 2024-04-26

## 2023-03-29 ASSESSMENT — ENCOUNTER SYMPTOMS
PALPITATIONS: 0
HEMATOCHEZIA: 0
NAUSEA: 0
WEAKNESS: 0
JOINT SWELLING: 0
NERVOUS/ANXIOUS: 0
PARESTHESIAS: 0
DYSURIA: 0
SORE THROAT: 0
DIZZINESS: 0
CONSTIPATION: 0
BREAST MASS: 0
HEADACHES: 0
EYE PAIN: 0
DIARRHEA: 1
ABDOMINAL PAIN: 0
MYALGIAS: 0
COUGH: 0
CHILLS: 0
HEMATURIA: 0
HEARTBURN: 0
ARTHRALGIAS: 0
FEVER: 0
FREQUENCY: 1
SHORTNESS OF BREATH: 0

## 2023-03-29 ASSESSMENT — PATIENT HEALTH QUESTIONNAIRE - PHQ9
10. IF YOU CHECKED OFF ANY PROBLEMS, HOW DIFFICULT HAVE THESE PROBLEMS MADE IT FOR YOU TO DO YOUR WORK, TAKE CARE OF THINGS AT HOME, OR GET ALONG WITH OTHER PEOPLE: NOT DIFFICULT AT ALL
SUM OF ALL RESPONSES TO PHQ QUESTIONS 1-9: 1
SUM OF ALL RESPONSES TO PHQ QUESTIONS 1-9: 1

## 2023-03-29 ASSESSMENT — ACTIVITIES OF DAILY LIVING (ADL): CURRENT_FUNCTION: NO ASSISTANCE NEEDED

## 2023-03-29 ASSESSMENT — PAIN SCALES - GENERAL: PAINLEVEL: NO PAIN (1)

## 2023-03-29 NOTE — PROGRESS NOTES
"  SUBJECTIVE:   Latha is a 67 year old who presents for Preventive Visit.  No flowsheet data found.{Split Bill scripting  The purpose of this visit is to discuss your medical history and prevent health problems before you are sick. You may be responsible for a co-pay, coinsurance, or deductible if your visit today includes services such as checking on a sore throat, having an x-ray or lab test, or treating and evaluating a new or existing condition :712500}  {Patient advised of split billing (Optional):773381}  Are you in the first 12 months of your Medicare coverage?  { :261611::\"No\"}    HPI    Have you ever done Advance Care Planning? (For example, a Health Directive, POLST, or a discussion with a medical provider or your loved ones about your wishes): { :025617}    {Hearing Test Done (Optional):786476}   Fall risk  { :672053}  {If any of the above assessments are answered yes, consider ordering appropriate referrals (Optional):954508::\"click delete button to remove this line now\"}  Cognitive Screening { :304932}    {Do you have sleep apnea, excessive snoring or daytime drowsiness? (Optional):997927}    Reviewed and updated as needed this visit by clinical staff                  Reviewed and updated as needed this visit by Provider                 Social History     Tobacco Use     Smoking status: Former     Types: Cigarettes     Quit date: 2002     Years since quittin.2     Smokeless tobacco: Never   Substance Use Topics     Alcohol use: Yes     Alcohol/week: 0.0 - 3.3 standard drinks     {Rooming staff  Click this link to complete the Prescreen if response below is not for today's visit  Alcohol Use Prescreen >3 drinks/day or > 7 drinks/week.  If the prescreen question answer is YES, complete the full AUDIT  :298053}    Alcohol Use 3/22/2023   Prescreen: >3 drinks/day or >7 drinks/week? No   {add AUDIT responses (Optional) (A score of 7 for adult men is an indication of hazardous drinking; a score of " "8 or more is an indication of an alcohol use disorder.  A score of 7 or more for adult women is an indication of hazardous drinking or an alchohol use disorder):652482}  Do you have a current opioid prescription? { :772584}  Do you use any other controlled substances or medications that are not prescribed by a provider? {Substance Use :069964::\"None\"}  {Provider  If there are gaps in the social history shown above, please follow the link and refresh the note Link to Social and Substance History :083008}    {Outside tests to abstract? :020475}    {additional problems to add (Optional):776329}    Current providers sharing in care for this patient include: {Rooming staff:  Please update Care Team from storyboard, refresh this note and then delete this statement}  Patient Care Team:  Cyndie Moe PA-C as PCP - General (Family Medicine)  Cyndie Moe PA-C as Assigned PCP    The following health maintenance items are reviewed in Epic and correct as of today:  Health Maintenance   Topic Date Due     DEXA  Never done     ZOSTER IMMUNIZATION (1 of 2) Never done     LUNG CANCER SCREENING  Never done     ANNUAL REVIEW OF HM ORDERS  04/14/2022     Pneumococcal Vaccine: 65+ Years (1 - PCV) Never done     TSH W/FREE T4 REFLEX  11/22/2022     PHQ-9  09/29/2023     MAMMO SCREENING  03/25/2024     MEDICARE ANNUAL WELLNESS VISIT  03/29/2024     FALL RISK ASSESSMENT  03/29/2024     DTAP/TDAP/TD IMMUNIZATION (2 - Td or Tdap) 12/23/2024     LIPID  11/22/2026     ADVANCE CARE PLANNING  11/22/2026     COLORECTAL CANCER SCREENING  10/05/2028     HEPATITIS C SCREENING  Completed     DEPRESSION ACTION PLAN  Completed     INFLUENZA VACCINE  Completed     COVID-19 Vaccine  Completed     IPV IMMUNIZATION  Aged Out     MENINGITIS IMMUNIZATION  Aged Out     HIV SCREENING  Discontinued     {Chronicprobdata (optional):473464}  {Decision Support (Optional):133622}    Breast CA Risk Assessment (FHS-7) 11/17/2021 3/22/2023   Do you have a " "family history of breast, colon, or ovarian cancer? Yes No / Unknown       {If any of the questions to the BCRA (FHS-7) are answered yes, consider ordering referral for genetic counseling (Optional) :893886::\"click delete button to remove this line now\"}  {AMB Mammogram Decision Support (Optional) :198864}  Pertinent mammograms are reviewed under the imaging tab.    Review of Systems  {ROS COMP (Optional):940771}    OBJECTIVE:   There were no vitals taken for this visit. Estimated body mass index is 34.43 kg/m  as calculated from the following:    Height as of 21: 1.638 m (5' 4.5\").    Weight as of 21: 92.4 kg (203 lb 11.2 oz).  Physical Exam  {Exam (Optional) :120903}    {Diagnostic Test Results (Optional):165284::\"Diagnostic Test Results:\",\"Labs reviewed in Epic\"}    ASSESSMENT / PLAN:   {Diag Picklist:757037}    {Patient advised of split billing (Optional):092601}      COUNSELING:  {Medicare Counselin}        She reports that she quit smoking about 21 years ago. She has never used smokeless tobacco.      Appropriate preventive services were discussed with this patient, including applicable screening as appropriate for cardiovascular disease, diabetes, osteopenia/osteoporosis, and glaucoma.  As appropriate for age/gender, discussed screening for colorectal cancer, prostate cancer, breast cancer, and cervical cancer. Checklist reviewing preventive services available has been given to the patient.    Reviewed patients plan of care and provided an AVS. The {CarePlan:593761} for Latha meets the Care Plan requirement. This Care Plan has been established and reviewed with the {PATIENT, FAMILY MEMBER, CAREGIVER:210316}.    {Counseling Resources  [place] Preventive Services Task Force  Cholesterol Screening  Health diet/nutrition  Pooled Cohorts Equation Calculator  USDA's MyPlate  ASA Prophylaxis  Lung CA Screening  Osteoporosis prevention/bone health :927639}  {Breast Cancer Risk Calculator  BRCA-Related " Cancer Risk Assessment FHS-7 Tool :131569}    Cyndie Moe PA-C  [facility]    Identified Health Risks:  {Medicare required documentation of substance and opioid use disorders screening :529455}

## 2023-03-29 NOTE — NURSING NOTE
"Chief Complaint   Patient presents with     Medicare Visit     Blood Draw     Fasting labs.      Initial /80 (BP Location: Right arm, Patient Position: Sitting, Cuff Size: Adult Large)   Pulse 84   Temp 98.3  F (36.8  C) (Oral)   Resp 16   Ht 1.632 m (5' 4.25\")   Wt 97.1 kg (214 lb 1.6 oz)   SpO2 96%   BMI 36.46 kg/m   Estimated body mass index is 36.46 kg/m  as calculated from the following:    Height as of this encounter: 1.632 m (5' 4.25\").    Weight as of this encounter: 97.1 kg (214 lb 1.6 oz).  BP completed using cuff size large right arm    Lisa Magill, CMA    "

## 2023-03-29 NOTE — PATIENT INSTRUCTIONS
Patient Education   Personalized Prevention Plan  You are due for the preventive services outlined below.  Your care team is available to assist you in scheduling these services.  If you have already completed any of these items, please share that information with your care team to update in your medical record.  Health Maintenance Due   Topic Date Due     Osteoporosis Screening  Never done     Zoster (Shingles) Vaccine (1 of 2) Never done     LUNG CANCER SCREENING  Never done     ANNUAL REVIEW OF HM ORDERS  04/14/2022     Pneumococcal Vaccine (1 - PCV) Never done     Thyroid Function Lab  11/22/2022        Patient Education   Personalized Prevention Plan  You are due for the preventive services outlined below.  Your care team is available to assist you in scheduling these services.  If you have already completed any of these items, please share that information with your care team to update in your medical record.  Health Maintenance Due   Topic Date Due     Osteoporosis Screening  Never done     Zoster (Shingles) Vaccine (1 of 2) Never done     ANNUAL REVIEW OF HM ORDERS  04/14/2022     Pneumococcal Vaccine (1 - PCV) Never done     Thyroid Function Lab  11/22/2022       Exercise for a Healthier Heart  You may wonder how you can improve the health of your heart. If you re thinking about exercise, you re on the right track. You don t need to become an athlete. But you do need a certain amount of brisk exercise to help strengthen your heart. If you have been diagnosed with a heart condition, your healthcare provider may advise exercise to help your condition. To help make exercise a habit, choose safe, fun activities.      Exercise with a friend. When activity is fun, you're more likely to stick with it.     Before you start  Check with your healthcare provider before starting an exercise program. This is especially important if you haven't been active for a while. It's also important if you have a long-term  (chronic) health problem such as heart disease, diabetes, or obesity. Also check with your provider if you're at high risk for having these problems.   Why exercise?  Exercising regularly offers many healthy rewards. It can help you do all of these:     Improve your blood cholesterol level to help prevent further heart trouble.    Lower your blood pressure to help prevent a stroke or heart attack.    Control diabetes or reduce your risk of getting this disease.    Improve your heart and lung function.    Reach and stay at a healthy weight.    Make your muscles stronger so you can stay active.    Prevent falls and fractures by slowing the loss of bone mass (osteoporosis).    Manage stress better.    Improve your sense of self and your body image.  Exercise tips      Ease into your routine. Set small goals. Then build on them. Talk with your healthcare provider first before starting an exercise routine if you're not sure what your activity level should be.    Exercise on most days. Aim for a total of at least 150 minutes (2 hours and 30 minutes) or more of moderate-intensity aerobic activity each week. You could also do 75 minutes (1 hour and 15 minutes) or more of vigorous-intensity aerobic activity each week. Or try for a combination of both. Moderate activity means that you breathe heavier and your heart rate increases, but you can still talk. Think about doing at least 30 minutes of moderate exercise, 5 times a week. It's OK to work up to the 30-minute period over time. Examples of moderate-intensity activity are brisk walking, gardening, and water aerobics.    Step up your daily activity level.  Along with your exercise program, try being more active the whole day. Walk instead of drive. Or park further away so that you take more steps each day. Do more household tasks or yard work. You may not be able to meet the advised amount of physical activity. But doing some moderate- or vigorous-intensity aerobic activity  can help reduce your risk for heart disease. Your healthcare provider can help you figure out what is best for you.    Choose 1 or more activities you enjoy.  Walking is one of the easiest things you can do. You can also try swimming, riding a bike, dancing, or taking an exercise class.    Call 911  Call 911 right away if any of these occur:     Chest pain that doesn't go away quickly with rest    New burning, tightness, pressure, or heaviness in your chest, neck, shoulders, back, or arms    Abnormal or severe shortness of breath    A very fast or irregular heartbeat (palpitations)    Fainting  When to call your healthcare provider  Call your healthcare provider if you have any of these:     Dizziness or lightheadedness    Mild shortness of breath or chest pain    Increased or new joint or muscle pain    [employer] last reviewed this educational content on 7/1/2022 2000-2022 [employer]. All rights reserved. This information is not intended as a substitute for professional medical care. Always follow your healthcare professional's instructions.          Understanding USDA MyPlate  The USDA has guidelines to help you make healthy food choices. These are called MyPlate. MyPlate shows the food groups that make up healthy meals using the image of a place setting. Before you eat, think about the healthiest choices for what to put on your plate or in your cup or bowl. To learn more about building a healthy plate, visit www.choosemyplate.gov.     The food groups    Fruits. Any fruit or 100% fruit juice counts as part of the Fruit Group. Fruits may be fresh, canned, frozen, or dried, and may be whole, cut-up, or pureed. Make 1/2 of your plate fruits and vegetables.    Vegetables. Any vegetable or 100% vegetable juice counts as a member of the Vegetable Group. Vegetables may be fresh, frozen, canned, or dried. They can be served raw or cooked and may be whole, cut-up, or mashed. Make 1/2 of your plate fruits  and vegetables.    Grains. All foods made from grains are part of the Grains Group. These include wheat, rice, oats, cornmeal, and barley. Grains are often used to make foods such as bread, pasta, oatmeal, cereal, tortillas, and grits. Grains should be no more than 1/4 of your plate. At least half of your grains should be whole grains.    Protein. This group includes meat, poultry, seafood, beans and peas, eggs, processed soy products (such as tofu), nuts (including nut butters), and seeds. Make protein choices no more than 1/4 of your plate. Meat and poultry choices should be lean or low fat.    Dairy. The Dairy Group includes all fluid milk products and foods made from milk that contain calcium, such as yogurt and cheese. (Foods that have little calcium, such as cream, butter, and cream cheese, are not part of this group.) Most dairy choices should be low-fat or fat-free.    Oils. Oils aren't a food group, but they do contain essential nutrients. However it's important to watch your intake of oils. These are fats that are liquid at room temperature. They include canola, corn, olive, soybean, vegetable, and sunflower oil. Foods that are mainly oil include mayonnaise, certain salad dressings, and soft margarines. You likely already get your daily oil allowance from the foods you eat.  Things to limit  Eating healthy also means limiting these things in your diet:    Salt (sodium). Many processed foods have a lot of sodium. To keep sodium intake down, eat fresh vegetables, meats, poultry, and seafood when possible. Purchase low-sodium, reduced-sodium, or no-salt-added food products at the store. And don't add salt to your meals at home. Instead, season them with herbs and spices such as dill, oregano, cumin, and paprika. Or try adding flavor with lemon or lime zest and juice.    Saturated fat. Saturated fats are most often found in animal products such as beef, pork, and chicken. They are often solid at room  temperature, such as butter. To reduce your saturated fat intake, choose leaner cuts of meat and poultry. And try healthier cooking methods such as grilling, broiling, roasting, or baking. For a simple lower-fat swap, use plain nonfat yogurt instead of mayonnaise when making potato salad or macaroni salad.    Added sugars. These are sugars added to foods. They are in foods such as ice cream, candy, soda, fruit drinks, sports drinks, energy drinks, cookies, pastries, jams, and syrups. Cut down on added sugars by sharing sweet treats with a family member or friend. You can also choose fruit for dessert, and drink water or other unsweetened beverages.  [employer] last reviewed this educational content on 6/1/2020 2000-2022 [employer]. All rights reserved. This information is not intended as a substitute for professional medical care. Always follow your healthcare professional's instructions.          Signs of Hearing Loss  Hearing loss is a problem shared by many people. In fact, it's one of the most common health problems, particularly as people age. Most people aged 65 and older have some hearing loss. By age 80, almost everyone does. Hearing loss often occurs slowly over the years. So, you may not realize your hearing has gotten worse.   When sudden hearing loss occurs, it's important to contact your healthcare provider right away. Your provider will do a medical exam and a hearing exam as soon as possible. This is to help find the cause and type of your sudden hearing loss. Based on your diagnosis, your healthcare provider will discuss possible treatments.      Hearing much better with one ear can be a sign of hearing loss.     Have your hearing checked  Call your healthcare provider if you:     Have to strain to hear normal conversation    Have to watch other people s faces very carefully to follow what they re saying    Need to ask people to repeat what they ve said    Often misunderstand what  people are saying    Turn the volume of the television or radio up so high that others complain    Feel that people are mumbling when they re talking to you    Find that the effort to hear leaves you feeling tired and irritated    Notice, when using the phone, that you hear better with one ear than the other  [employer] last reviewed this educational content on 6/1/2022 2000-2022 [employer]. All rights reserved. This information is not intended as a substitute for professional medical care. Always follow your healthcare professional's instructions.          Urinary Incontinence, Female (Adult)   Urinary incontinence means loss of bladder control. This problem affects many women, especially as they get older. If you have incontinence, you may be embarrassed to ask for help. But know that this problem can be treated.   Types of Incontinence  There are different types of incontinence. Two of the main types are described here. You can have more than one type.     Stress incontinence. With this type, urine leaks when pressure (stress) is put on the bladder. This may happen when you cough, sneeze, or laugh. Stress incontinence most often occurs because the pelvic floor muscles that support the bladder and urethra are weak. This can happen after pregnancy and vaginal childbirth or a hysterectomy. It can also be due to excess body weight or hormone changes.    Urge incontinence (also called overactive bladder). With this type, a sudden urge to urinate is felt often. This may happen even though there may not be much urine in the bladder. The need to urinate often during the night is common. Urge incontinence most often occurs because of bladder spasms. This may be due to bladder irritation or infection. Damage to bladder nerves or pelvic muscles, constipation, and certain medicines can also lead to urge incontinence.  Treatment depends on the cause. Further evaluation is needed to find the type you have. This  will likely include an exam and certain tests. Based on the results, you and your healthcare provider can then plan treatment. Until a diagnosis is made, the home care tips below can help ease symptoms.   Home care    Do pelvic floor muscle exercises, if they are prescribed. The pelvic floor muscles help support the bladder and urethra. Many women find that their symptoms improve when doing special exercises that strengthen these muscles. To do the exercises, contract the muscles you would use to stop your stream of urine. But do this when you re not urinating. Hold for 10 seconds, then relax. Repeat 10 to 20 times in a row, at least 3 times a day. Your healthcare provider may give you other instructions for how to do the exercises and how often.    Keep a bladder diary. This helps track how often and how much you urinate over a set period of time. Bring this diary with you to your next visit with the provider. The information can help your provider learn more about your bladder problem.    Lose weight, if advised to by your provider. Extra weight puts pressure on the bladder. Your provider can help you create a weight-loss plan that s right for you. This may include exercising more and making certain diet changes.    Don't have foods and drinks that may irritate the bladder. These can include alcohol and caffeinated drinks.    Quit smoking. Smoking and other tobacco use can lead to a long-term (chronic) cough that strains the pelvic floor muscles. Smoking may also damage the bladder and urethra. Talk with your provider about treatments or methods you can use to quit smoking.    If drinking large amounts of fluid makes you have symptoms, you may be advised to limit your fluid intake. You may also be advised to drink most of your fluids during the day and to limit fluids at night.    If you re worried about urine leakage or accidents, you may wear absorbent pads to catch urine. Change the pads often. This helps  reduce discomfort. It may also reduce the risk of skin or bladder infections.    Follow-up care  Follow up with your healthcare provider, or as directed. It may take some to find the right treatment for your problem. But healthy lifestyle changes can be made right away. These include such things as exercising on a regular basis, eating a healthy diet, losing weight (if needed), and quitting smoking. Your treatment plan may include special therapies or medicines. Certain procedures or surgery may also be options. Talk about any questions you have with your provider.   When to seek medical advice  Call the healthcare provider right away if any of these occur:    Fever of 100.4 F (38 C) or higher, or as directed by your provider    Bladder pain or fullness    Belly swelling    Nausea or vomiting    Back pain    Weakness, dizziness, or fainting  [employer] last reviewed this educational content on 1/1/2020 2000-2022 [employer]. All rights reserved. This information is not intended as a substitute for professional medical care. Always follow your healthcare professional's instructions.
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
## 2023-03-30 LAB — IGA SERPL-MCNC: 169 MG/DL (ref 84–499)

## 2023-03-31 LAB
TTG IGA SER-ACNC: 0.4 U/ML
TTG IGG SER-ACNC: <0.6 U/ML

## 2023-04-12 PROCEDURE — 82274 ASSAY TEST FOR BLOOD FECAL: CPT | Performed by: PHYSICIAN ASSISTANT

## 2023-04-12 PROCEDURE — 83993 ASSAY FOR CALPROTECTIN FECAL: CPT | Performed by: PHYSICIAN ASSISTANT

## 2023-04-13 DIAGNOSIS — R19.7 DIARRHEA, UNSPECIFIED TYPE: Primary | ICD-10-CM

## 2023-04-13 DIAGNOSIS — R19.5 POSITIVE FIT (FECAL IMMUNOCHEMICAL TEST): ICD-10-CM

## 2023-04-13 LAB — HEMOCCULT STL QL IA: POSITIVE

## 2023-04-14 ENCOUNTER — TELEPHONE (OUTPATIENT)
Dept: GASTROENTEROLOGY | Facility: CLINIC | Age: 68
End: 2023-04-14
Payer: COMMERCIAL

## 2023-04-14 DIAGNOSIS — R19.5 POSITIVE FIT (FECAL IMMUNOCHEMICAL TEST): ICD-10-CM

## 2023-04-14 DIAGNOSIS — R19.5 ELEVATED FECAL CALPROTECTIN: ICD-10-CM

## 2023-04-14 DIAGNOSIS — R19.7 DIARRHEA, UNSPECIFIED TYPE: Primary | ICD-10-CM

## 2023-04-14 LAB — CALPROTECTIN STL-MCNT: 161 MG/KG (ref 0–49.9)

## 2023-04-14 NOTE — TELEPHONE ENCOUNTER
Screening Questions  BLUE  KIND OF PREP RED  LOCATION [review exclusion criteria] GREEN  SEDATION TYPE        YES Are you active on mychart?       Cyndie Moe,  Ordering/Referring Provider?        BCBS What type of coverage do you have?      NO Have you had a positive covid test in the last 14 days?     35.1 1. BMI  [BMI 40+ - review exclusion criteria]    Y  2. Are you able to give consent for your medical care? [IF NO,RN REVIEW]          N  3. Are you taking any prescription pain medications on a routine schedule   (ex narcotics: oxycodone, roxicodone, oxycontin,  and percocet)? [RN Review]        NA  3a. EXTENDED PREP What kind of prescription?     N 4. Do you have any chemical dependencies such as alcohol, street drugs, or methadone?        **If yes 3- 5 , please schedule with MAC sedation.**          IF YES TO ANY 6 - 10 - HOSPITAL SETTING ONLY.     N 6.   Do you need assistance transferring?     N 7.   Have you had a heart or lung transplant?    N 8.   Are you currently on dialysis?   N 9.   Do you use daily home oxygen?   N 10. Do you take nitroglycerin?   10a. NA If yes, how often?     11. [FEMALES]   Are you currently pregnant?    11a.  If yes, how many weeks? [ Greater than 12 weeks, OR NEEDED]    N 12. Do you have Pulmonary Hypertension? *NEED PAC APPT AT [facility] w/ [facility]*     N 13. [review exclusion criteria]  Do you have any implantable devices in your body (pacemaker, defib, LVAD)?    N 14. In the past 6 months, have you had any heart related issues including cardiomyopathy or heart attack?     14a. NA If yes, did it require cardiac stenting if so when?     N 15. Have you had a stroke or Transient ischemic attack (TIA - aka  mini stroke ) within 6 months?      N 16. Do you have mod to severe Obstructive Sleep Apnea?  [Hospital only]    N 17. Do you have SEVERE AND UNCONTROLLED asthma? *NEED PAC APPT AT [facility] w/MAC*     18. Are you currently taking any blood thinners?     18a. No. Continue to  "19.   18b. Yes/no Blood Thinner: No [CONTINUE TO #19]    N 19. Do you take the medication Phentermine?    19a. If yes, \"Hold for 7 days before procedure.  Please consult your prescribing provider if you have questions about holding this medication.\"     N  20. Do you have chronic kidney disease?      N  21. Do you have a diagnosis of diabetes?     N  22. On a regular basis do you go 3-5 days between bowel movements?      23. Preferred LOCAL Pharmacy for Pre Prescription    [ LIST ONLY ONE PHARMACY]          [facility]/PHARMACY #[id number] - [facility] [address]        - CLOSING REMINDERS -    Informed patient they will need an adult    Cannot take any type of public or medical transportation alone    Conscious Sedation- Needs  for 6 hours after the procedure       MAC/General-Needs  for 24 hours after procedure    Pre-Procedure Covid test to be completed [[facility] PCR Testing Required]    Confirmed Nurse will call to complete assessment       - SCHEDULING DETAILS -   Hospital Setting Required? If yes, what is the exclusion?: NO   CLAIRE  Surgeon    6/29/23  Date of Procedure  Lower Endoscopy [Colonoscopy]  Type of Procedure Scheduled  [facility] Location   MIRALAX GATORADE WITHOUT MAGNEISUM CITRATE Which Colonoscopy Prep was Sent?     MODERATE Sedation Type     N PAC / Pre-op Required                 "

## 2023-04-24 ENCOUNTER — TELEPHONE (OUTPATIENT)
Dept: GASTROENTEROLOGY | Facility: CLINIC | Age: 68
End: 2023-04-24
Payer: COMMERCIAL

## 2023-04-24 NOTE — TELEPHONE ENCOUNTER
Caller: Debra Jo Ganser    Reason for Reschedule/Cancellation (please be detailed, any staff messages or encounters to note?): Patient reached out to reschedule,  not available that day      Prior to reschedule please review:    Ordering Provider:Cyndie Moe PA-C    Sedation per order:MODERATE    Does patient have any ASC Exclusions, please identify?: NO      Notes on Cancelled Procedure:    Procedure:Lower Endoscopy [Colonoscopy]     Date: 6/29    [facility]; [facility] [address] [place] [address]    Surgeon: CLAIRE        Rescheduled: Yes    Procedure: Lower Endoscopy [Colonoscopy]     Date: 7/13    Location:[facility]; [address] [place] [address]    Surgeon: CLAIRE    Sedation Level Scheduled  MODERATE,  Reason for Sedation Level PER ORDER    Prep/Instructions updated and sent: SINAN

## 2023-07-13 ENCOUNTER — HOSPITAL ENCOUNTER (OUTPATIENT)
Facility: CLINIC | Age: 68
Discharge: HOME | End: 2023-07-13
Attending: INTERNAL MEDICINE | Admitting: INTERNAL MEDICINE
Payer: COMMERCIAL

## 2023-07-13 VITALS
RESPIRATION RATE: 16 BRPM | SYSTOLIC BLOOD PRESSURE: 138 MMHG | DIASTOLIC BLOOD PRESSURE: 72 MMHG | OXYGEN SATURATION: 98 % | HEART RATE: 78 BPM | TEMPERATURE: 97.3 F

## 2023-07-13 LAB — COLONOSCOPY: NORMAL

## 2023-07-13 PROCEDURE — 258N000003 HC RX IP 258 OP 636: Performed by: INTERNAL MEDICINE

## 2023-07-13 PROCEDURE — G0500 MOD SEDAT ENDO SERVICE >5YRS: HCPCS | Performed by: INTERNAL MEDICINE

## 2023-07-13 PROCEDURE — 250N000011 HC RX IP 250 OP 636: Mod: JZ | Performed by: INTERNAL MEDICINE

## 2023-07-13 PROCEDURE — G0121 COLON CA SCRN NOT HI RSK IND: HCPCS | Mod: KX | Performed by: INTERNAL MEDICINE

## 2023-07-13 PROCEDURE — 45378 DIAGNOSTIC COLONOSCOPY: CPT | Performed by: INTERNAL MEDICINE

## 2023-07-13 RX ORDER — FLUMAZENIL 0.1 MG/ML
0.2 INJECTION, SOLUTION INTRAVENOUS
Status: DISCONTINUED | OUTPATIENT
Start: 2023-07-13 | End: 2023-07-13 | Stop reason: HOSPADM

## 2023-07-13 RX ORDER — SIMETHICONE 40MG/0.6ML
133 SUSPENSION, DROPS(FINAL DOSAGE FORM)(ML) ORAL
Status: DISCONTINUED | OUTPATIENT
Start: 2023-07-13 | End: 2023-07-13 | Stop reason: HOSPADM

## 2023-07-13 RX ORDER — NALOXONE HYDROCHLORIDE 0.4 MG/ML
0.4 INJECTION, SOLUTION INTRAMUSCULAR; INTRAVENOUS; SUBCUTANEOUS
Status: DISCONTINUED | OUTPATIENT
Start: 2023-07-13 | End: 2023-07-13 | Stop reason: HOSPADM

## 2023-07-13 RX ORDER — DIPHENHYDRAMINE HYDROCHLORIDE 50 MG/ML
25-50 INJECTION INTRAMUSCULAR; INTRAVENOUS
Status: DISCONTINUED | OUTPATIENT
Start: 2023-07-13 | End: 2023-07-13 | Stop reason: HOSPADM

## 2023-07-13 RX ORDER — NALOXONE HYDROCHLORIDE 0.4 MG/ML
0.2 INJECTION, SOLUTION INTRAMUSCULAR; INTRAVENOUS; SUBCUTANEOUS
Status: DISCONTINUED | OUTPATIENT
Start: 2023-07-13 | End: 2023-07-13 | Stop reason: HOSPADM

## 2023-07-13 RX ORDER — FENTANYL CITRATE 50 UG/ML
50-100 INJECTION, SOLUTION INTRAMUSCULAR; INTRAVENOUS EVERY 5 MIN PRN
Status: DISCONTINUED | OUTPATIENT
Start: 2023-07-13 | End: 2023-07-13 | Stop reason: HOSPADM

## 2023-07-13 RX ORDER — EPINEPHRINE 1 MG/ML
0.1 INJECTION, SOLUTION INTRAMUSCULAR; SUBCUTANEOUS
Status: DISCONTINUED | OUTPATIENT
Start: 2023-07-13 | End: 2023-07-13 | Stop reason: HOSPADM

## 2023-07-13 RX ORDER — ONDANSETRON 4 MG/1
4 TABLET, ORALLY DISINTEGRATING ORAL EVERY 6 HOURS PRN
Status: DISCONTINUED | OUTPATIENT
Start: 2023-07-13 | End: 2023-07-13 | Stop reason: HOSPADM

## 2023-07-13 RX ORDER — PROCHLORPERAZINE MALEATE 5 MG
5 TABLET ORAL EVERY 6 HOURS PRN
Status: DISCONTINUED | OUTPATIENT
Start: 2023-07-13 | End: 2023-07-13 | Stop reason: HOSPADM

## 2023-07-13 RX ORDER — LIDOCAINE 40 MG/G
CREAM TOPICAL
Status: DISCONTINUED | OUTPATIENT
Start: 2023-07-13 | End: 2023-07-13 | Stop reason: HOSPADM

## 2023-07-13 RX ORDER — ONDANSETRON 2 MG/ML
4 INJECTION INTRAMUSCULAR; INTRAVENOUS
Status: COMPLETED | OUTPATIENT
Start: 2023-07-13 | End: 2023-07-13

## 2023-07-13 RX ORDER — ATROPINE SULFATE 0.1 MG/ML
1 INJECTION INTRAVENOUS
Status: DISCONTINUED | OUTPATIENT
Start: 2023-07-13 | End: 2023-07-13 | Stop reason: HOSPADM

## 2023-07-13 RX ORDER — ONDANSETRON 2 MG/ML
4 INJECTION INTRAMUSCULAR; INTRAVENOUS EVERY 6 HOURS PRN
Status: DISCONTINUED | OUTPATIENT
Start: 2023-07-13 | End: 2023-07-13 | Stop reason: HOSPADM

## 2023-07-13 RX ADMIN — MIDAZOLAM 1 MG: 1 INJECTION INTRAMUSCULAR; INTRAVENOUS at 12:30

## 2023-07-13 RX ADMIN — ONDANSETRON 4 MG: 2 INJECTION INTRAMUSCULAR; INTRAVENOUS at 12:16

## 2023-07-13 RX ADMIN — MIDAZOLAM 1 MG: 1 INJECTION INTRAMUSCULAR; INTRAVENOUS at 12:31

## 2023-07-13 RX ADMIN — SODIUM CHLORIDE 500 ML: 9 INJECTION, SOLUTION INTRAVENOUS at 12:16

## 2023-07-13 RX ADMIN — MIDAZOLAM 2 MG: 1 INJECTION INTRAMUSCULAR; INTRAVENOUS at 12:25

## 2023-07-13 ASSESSMENT — ACTIVITIES OF DAILY LIVING (ADL): ADLS_ACUITY_SCORE: 35

## 2023-07-13 NOTE — H&P
Pre-Endoscopy History and Physical     Debra Jo Ganser MRN# [id number]   YOB: 1955 Age: 68 year old     Date of Procedure: 2023  Primary care provider: Cyndie Moe  Type of Endoscopy: Colonoscopy with possible biopsy, possible polypectomy  Reason for Procedure: positive FIT  Type of Anesthesia Anticipated: Conscious Sedation    HPI:    Latha is a 68 year old female who will be undergoing the above procedure.      A history and physical has been performed. The patient's medications and allergies have been reviewed. The risks and benefits of the procedure and the sedation options and risks were discussed with the patient.  All questions were answered and informed consent was obtained.      She denies a personal or family history of anesthesia complications or bleeding disorders.     Patient Active Problem List   Diagnosis     Reactive depression (situational)     Hypothyroidism     CARDIOVASCULAR SCREENING; LDL GOAL LESS THAN 160     OA (osteoarthritis)     Pre-diabetes     Advanced directives, counseling/discussion     Elevated blood pressure reading without diagnosis of hypertension     Morbid obesity (H)        Past Medical History:   Diagnosis Date     Arthritis     I've had 2 hip replacements     Hypothyroidism         Past Surgical History:   Procedure Laterality Date     ABDOMEN SURGERY  2009    tummy tuck     COLONOSCOPY  2018     HYSTERECTOMY, PAP NO LONGER INDICATED  2001    fibroid     SURGICAL HISTORY OF -       bladder tack; pelvic support surgery     SURGICAL HISTORY OF -   age 34    tonsillectomy     SURGICAL HISTORY OF -       tummy tuck     SURGICAL HISTORY OF -   2007    right hip replacement       Social History     Tobacco Use     Smoking status: Former     Packs/day: 0.00     Years: 10.00     Pack years: 0.00     Types: Cigarettes     Start date: 1976     Quit date: 2002     Years since quittin.5     Smokeless tobacco: Never   Substance Use  "Topics     Alcohol use: Yes     Alcohol/week: 0.0 - 3.3 standard drinks of alcohol     Comment: rarely       Family History   Problem Relation Age of Onset     Endocrine Disease Mother         hypothyroid     Depression Mother      Lung Cancer Mother      Hyperlipidemia Mother      Thyroid Disease Mother      Depression Maternal Grandmother      Endocrine Disease Sister         diabetes     Diabetes Sister          from Diabetes when whe was 10     Endocrine Disease Maternal Uncle         diabetes     Hypertension Father      Hyperlipidemia Father      Connective Tissue Disorder Sister         RA     Hypothyroidism Sister      Multiple Sclerosis Sister      Thyroid Disease Sister        Prior to Admission medications    Medication Sig Start Date End Date Taking? Authorizing Provider   Bioflavonoid Products (BIOFLEX PO)     Reported, Patient   buPROPion (WELLBUTRIN XL) 150 MG 24 hr tablet Take 1 tablet (150 mg) by mouth daily 3/29/23   Cyndie Moe PA-C   fish oil-omega-3 fatty acids 1000 MG capsule Take 2 g by mouth    Reported, Patient   levothyroxine (SYNTHROID/LEVOTHROID) 88 MCG tablet Take 1 tablet (88 mcg) by mouth daily 3/29/23   Cyndie Moe PA-C   Multiple Vitamins-Iron (MULTI-VITAMIN  /IRON) TABS Take 1 tablet by mouth    Reported, Patient       Allergies   Allergen Reactions     Codeine Nausea and Vomiting     Horrible nausea and vomiting     Fentanyl Nausea and Vomiting     Horrible nausea and vomiting     Morphine Hcl      No Morphine based med--sweaty and vomiting        REVIEW OF SYSTEMS:   5 point ROS negative except as noted above in HPI, including Gen., Resp., CV, GI &  system review.    PHYSICAL EXAM:   There were no vitals taken for this visit. Estimated body mass index is 36.46 kg/m  as calculated from the following:    Height as of 3/29/23: 1.632 m (5' 4.25\").    Weight as of 3/29/23: 97.1 kg (214 lb 1.6 oz).   GENERAL APPEARANCE: alert, and oriented  MENTAL STATUS: alert  AIRWAY " EXAM: Mallampatti Class I (visualization of the soft palate, fauces, uvula, anterior and posterior pillars)  RESP: lungs clear to auscultation - no rales, rhonchi or wheezes  CV: regular rates and rhythm  DIAGNOSTICS:    Not indicated    IMPRESSION   ASA Class 2 - Mild systemic disease    PLAN:   Plan for Colonoscopy with possible biopsy, possible polypectomy. We discussed the risks, benefits and alternatives and the patient wished to proceed.    The above has been forwarded to the consulting provider.      Signed Electronically by: Charly Worrell MD  July 13, 2023

## 2023-12-11 ENCOUNTER — OFFICE VISIT (OUTPATIENT)
Dept: DERMATOLOGY | Facility: CLINIC | Age: 68
End: 2023-12-11
Payer: COMMERCIAL

## 2023-12-11 DIAGNOSIS — L81.4 LENTIGINES: ICD-10-CM

## 2023-12-11 DIAGNOSIS — L57.8 ACTINIC SKIN DAMAGE: Primary | ICD-10-CM

## 2023-12-11 DIAGNOSIS — D22.9 MULTIPLE BENIGN NEVI: ICD-10-CM

## 2023-12-11 DIAGNOSIS — L82.1 SEBORRHEIC KERATOSES: ICD-10-CM

## 2023-12-11 PROCEDURE — 99203 OFFICE O/P NEW LOW 30 MIN: CPT | Performed by: STUDENT IN AN ORGANIZED HEALTH CARE EDUCATION/TRAINING PROGRAM

## 2023-12-11 ASSESSMENT — PAIN SCALES - GENERAL: PAINLEVEL: NO PAIN (0)

## 2023-12-11 NOTE — PROGRESS NOTES
[facility] Dermatology Note    Encounter Date: Dec 11, 2023    Dermatology Problem List:  -     Major PMHx  -   ______________________________________    Impression/Plan:  Latha was seen today for skin check.    Diagnoses and all orders for this visit:    Actinic skin damage  Multiple benign nevi  Lentigines  - Reviewed the compounding benefits of incremental changes to sun protective clothing behaviors including increased frequency of sunscreen and sun protective clothing like broad brimmed hats and longsleeved UPF containing clothing    Seborrheic keratoses  - benign  - carmen anal lesion c/w SK       Follow-up in 1 year.       Staff Involved:  Staff Only    Moi Rodriguez MD   of Dermatology  Department of Dermatology  [facility]      CC:   Chief Complaint   Patient presents with    Skin Check       History of Present Illness:  Ms. Debra Jo Ganser is a 68 year old female who presents as a new patient.    Pt presents today for concerns about spots on trunk and extremities.  Has a spot on her left medial gluteal cleft area that sometimes itches      Labs:      Physical exam:  Vitals: There were no vitals taken for this visit.  GEN: well developed, well-nourished, in no acute distress, in a pleasant mood.     SKIN: Pablo phototype 1  - Full skin, which includes the head/face, both arms, chest, back, abdomen,both legs, genitalia and/or groin buttocks, digits and/or nails, was examined.  - Flat brown macules and patches in a sun exposed areas on face and extremities  - Stuck on brown papules on trunk and extremities   - No other lesions of concern on areas examined.     Past Medical History:   Past Medical History:   Diagnosis Date    Arthritis 2006    I've had 2 hip replacements    Hypothyroidism      Past Surgical History:   Procedure Laterality Date    ABDOMEN SURGERY  2009    tummy tuck    COLONOSCOPY  2018    COLONOSCOPY N/A 7/13/2023     Procedure: Colonoscopy;  Surgeon: Charly Worrell MD;  Location: [facility]    HYSTERECTOMY, PAP NO LONGER INDICATED  2001    fibroid    SURGICAL HISTORY OF -       bladder tack; pelvic support surgery    SURGICAL HISTORY OF -   age 34    tonsillectomy    SURGICAL HISTORY OF -       tummy tuck    SURGICAL HISTORY OF -   2007    right hip replacement       Social History:   reports that she quit smoking about 21 years ago. Her smoking use included cigarettes. She started smoking about 47 years ago. She has never used smokeless tobacco. She reports current alcohol use. She reports that she does not use drugs.    Family History:  Family History   Problem Relation Age of Onset    Endocrine Disease Mother         hypothyroid    Depression Mother     Lung Cancer Mother     Hyperlipidemia Mother     Thyroid Disease Mother     Depression Maternal Grandmother     Endocrine Disease Sister         diabetes    Diabetes Sister          from Diabetes when whe was 10    Endocrine Disease Maternal Uncle         diabetes    Hypertension Father     Hyperlipidemia Father     Connective Tissue Disorder Sister         RA    Hypothyroidism Sister     Multiple Sclerosis Sister     Thyroid Disease Sister        Medications:  Current Outpatient Medications   Medication Sig Dispense Refill    buPROPion (WELLBUTRIN XL) 150 MG 24 hr tablet Take 1 tablet (150 mg) by mouth daily 90 tablet 3    fish oil-omega-3 fatty acids 1000 MG capsule Take 2 g by mouth      levothyroxine (SYNTHROID/LEVOTHROID) 88 MCG tablet Take 1 tablet (88 mcg) by mouth daily 90 tablet 3    Multiple Vitamins-Iron (MULTI-VITAMIN  /IRON) TABS Take 1 tablet by mouth      Bioflavonoid Products (BIOFLEX PO)  (Patient not taking: Reported on 2023)       Allergies   Allergen Reactions    Codeine Nausea and Vomiting     Horrible nausea and vomiting    Fentanyl Nausea and Vomiting     Horrible nausea and vomiting    Morphine Hcl      No Morphine based med--sweaty  and vomiting

## 2023-12-11 NOTE — LETTER
12/11/2023         RE: Debra Jo Ganser  [address] [place] [id number]        Dear Colleague,    Thank you for referring your patient, Debra Jo Ganser, to the [facility]. Please see a copy of my visit note below.    [facility] Dermatology Note    Encounter Date: Dec 11, 2023    Dermatology Problem List:  -     Major PMHx  -   ______________________________________    Impression/Plan:  Latha was seen today for skin check.    Diagnoses and all orders for this visit:    Actinic skin damage  Multiple benign nevi  Lentigines  - Reviewed the compounding benefits of incremental changes to sun protective clothing behaviors including increased frequency of sunscreen and sun protective clothing like broad brimmed hats and longsleeved UPF containing clothing    Seborrheic keratoses  - benign  - carmen anal lesion c/w SK       Follow-up in 1 year.       Staff Involved:  Staff Only    Moi Rodriguez MD   of Dermatology  Department of Dermatology  [facility]      CC:   Chief Complaint   Patient presents with     Skin Check       History of Present Illness:  Ms. Debra Jo Ganser is a 68 year old female who presents as a new patient.    Pt presents today for concerns about spots on trunk and extremities.  Has a spot on her left medial gluteal cleft area that sometimes itches      Labs:      Physical exam:  Vitals: There were no vitals taken for this visit.  GEN: well developed, well-nourished, in no acute distress, in a pleasant mood.     SKIN: Pablo phototype 1  - Full skin, which includes the head/face, both arms, chest, back, abdomen,both legs, genitalia and/or groin buttocks, digits and/or nails, was examined.  - Flat brown macules and patches in a sun exposed areas on face and extremities  - Stuck on brown papules on trunk and extremities   - No other lesions of concern on areas examined.     Past Medical  History:   Past Medical History:   Diagnosis Date     Arthritis     I've had 2 hip replacements     Hypothyroidism      Past Surgical History:   Procedure Laterality Date     ABDOMEN SURGERY  2009    tummy tuck     COLONOSCOPY  2018     COLONOSCOPY N/A 2023    Procedure: Colonoscopy;  Surgeon: Charly Worrell MD;  Location: [facility]     HYSTERECTOMY, PAP NO LONGER INDICATED  2001    fibroid     SURGICAL HISTORY OF -       bladder tack; pelvic support surgery     SURGICAL HISTORY OF -   age 34    tonsillectomy     SURGICAL HISTORY OF -       tumharjit alvarez     SURGICAL HISTORY OF -   2007    right hip replacement       Social History:   reports that she quit smoking about 21 years ago. Her smoking use included cigarettes. She started smoking about 47 years ago. She has never used smokeless tobacco. She reports current alcohol use. She reports that she does not use drugs.    Family History:  Family History   Problem Relation Age of Onset     Endocrine Disease Mother         hypothyroid     Depression Mother      Lung Cancer Mother      Hyperlipidemia Mother      Thyroid Disease Mother      Depression Maternal Grandmother      Endocrine Disease Sister         diabetes     Diabetes Sister          from Diabetes when whe was 10     Endocrine Disease Maternal Uncle         diabetes     Hypertension Father      Hyperlipidemia Father      Connective Tissue Disorder Sister         RA     Hypothyroidism Sister      Multiple Sclerosis Sister      Thyroid Disease Sister        Medications:  Current Outpatient Medications   Medication Sig Dispense Refill     buPROPion (WELLBUTRIN XL) 150 MG 24 hr tablet Take 1 tablet (150 mg) by mouth daily 90 tablet 3     fish oil-omega-3 fatty acids 1000 MG capsule Take 2 g by mouth       levothyroxine (SYNTHROID/LEVOTHROID) 88 MCG tablet Take 1 tablet (88 mcg) by mouth daily 90 tablet 3     Multiple Vitamins-Iron (MULTI-VITAMIN  /IRON) TABS Take 1 tablet by mouth        Bioflavonoid Products (BIOFLEX PO)  (Patient not taking: Reported on 12/11/2023)       Allergies   Allergen Reactions     Codeine Nausea and Vomiting     Horrible nausea and vomiting     Fentanyl Nausea and Vomiting     Horrible nausea and vomiting     Morphine Hcl      No Morphine based med--sweaty and vomiting               Again, thank you for allowing me to participate in the care of your patient.        Sincerely,        Moi Rodriguez MD

## 2024-01-22 ENCOUNTER — TELEPHONE (OUTPATIENT)
Dept: NURSING | Facility: CLINIC | Age: 69
End: 2024-01-22
Payer: COMMERCIAL

## 2024-01-22 NOTE — TELEPHONE ENCOUNTER
Telephone call  Patient calling she developed a wheeze and sneezing no fever. She denies any shortness of breath.  She only was calling to see if she should be worried because she lives with her daughter and her daughter is going to have a baby in about 2 weeks and she does not want the baby to catch what she has.  Her grandson has a cold and she thinks it may have come from him.  She will monitor and if it gets worse or she develops a fever or cough she will call back.    Cristel Emery RN   [facility] Nurse Advisor  12:49 PM 1/22/2024

## 2024-02-26 ENCOUNTER — PATIENT OUTREACH (OUTPATIENT)
Dept: CARE COORDINATION | Facility: CLINIC | Age: 69
End: 2024-02-26
Payer: COMMERCIAL

## 2024-02-28 ENCOUNTER — PATIENT OUTREACH (OUTPATIENT)
Dept: CARE COORDINATION | Facility: CLINIC | Age: 69
End: 2024-02-28
Payer: COMMERCIAL

## 2024-03-13 ENCOUNTER — PATIENT OUTREACH (OUTPATIENT)
Dept: CARE COORDINATION | Facility: CLINIC | Age: 69
End: 2024-03-13
Payer: COMMERCIAL

## 2024-03-19 SDOH — HEALTH STABILITY: PHYSICAL HEALTH: ON AVERAGE, HOW MANY MINUTES DO YOU ENGAGE IN EXERCISE AT THIS LEVEL?: 20 MIN

## 2024-03-19 SDOH — HEALTH STABILITY: PHYSICAL HEALTH: ON AVERAGE, HOW MANY DAYS PER WEEK DO YOU ENGAGE IN MODERATE TO STRENUOUS EXERCISE (LIKE A BRISK WALK)?: 3 DAYS

## 2024-03-19 ASSESSMENT — SOCIAL DETERMINANTS OF HEALTH (SDOH): HOW OFTEN DO YOU GET TOGETHER WITH FRIENDS OR RELATIVES?: THREE TIMES A WEEK

## 2024-03-25 ASSESSMENT — PATIENT HEALTH QUESTIONNAIRE - PHQ9: SUM OF ALL RESPONSES TO PHQ QUESTIONS 1-9: 2

## 2024-04-08 DIAGNOSIS — F32.9 REACTIVE DEPRESSION (SITUATIONAL): ICD-10-CM

## 2024-04-08 RX ORDER — BUPROPION HYDROCHLORIDE 150 MG/1
150 TABLET ORAL DAILY
Qty: 90 TABLET | Refills: 0 | Status: SHIPPED | OUTPATIENT
Start: 2024-04-08 | End: 2024-04-26

## 2024-04-25 SDOH — HEALTH STABILITY: PHYSICAL HEALTH: ON AVERAGE, HOW MANY DAYS PER WEEK DO YOU ENGAGE IN MODERATE TO STRENUOUS EXERCISE (LIKE A BRISK WALK)?: 1 DAY

## 2024-04-25 SDOH — HEALTH STABILITY: PHYSICAL HEALTH: ON AVERAGE, HOW MANY MINUTES DO YOU ENGAGE IN EXERCISE AT THIS LEVEL?: 10 MIN

## 2024-04-25 ASSESSMENT — SOCIAL DETERMINANTS OF HEALTH (SDOH): HOW OFTEN DO YOU GET TOGETHER WITH FRIENDS OR RELATIVES?: THREE TIMES A WEEK

## 2024-04-25 ASSESSMENT — PATIENT HEALTH QUESTIONNAIRE - PHQ9
10. IF YOU CHECKED OFF ANY PROBLEMS, HOW DIFFICULT HAVE THESE PROBLEMS MADE IT FOR YOU TO DO YOUR WORK, TAKE CARE OF THINGS AT HOME, OR GET ALONG WITH OTHER PEOPLE: NOT DIFFICULT AT ALL
SUM OF ALL RESPONSES TO PHQ QUESTIONS 1-9: 2
SUM OF ALL RESPONSES TO PHQ QUESTIONS 1-9: 2

## 2024-04-26 ENCOUNTER — OFFICE VISIT (OUTPATIENT)
Dept: FAMILY MEDICINE | Facility: CLINIC | Age: 69
End: 2024-04-26
Payer: COMMERCIAL

## 2024-04-26 VITALS
WEIGHT: 215 LBS | HEART RATE: 87 BPM | RESPIRATION RATE: 16 BRPM | OXYGEN SATURATION: 97 % | HEIGHT: 64 IN | DIASTOLIC BLOOD PRESSURE: 78 MMHG | TEMPERATURE: 97.2 F | SYSTOLIC BLOOD PRESSURE: 132 MMHG | BODY MASS INDEX: 36.7 KG/M2

## 2024-04-26 DIAGNOSIS — R73.03 PRE-DIABETES: ICD-10-CM

## 2024-04-26 DIAGNOSIS — F32.9 REACTIVE DEPRESSION (SITUATIONAL): ICD-10-CM

## 2024-04-26 DIAGNOSIS — E78.2 MIXED HYPERLIPIDEMIA: ICD-10-CM

## 2024-04-26 DIAGNOSIS — E03.9 HYPOTHYROIDISM, UNSPECIFIED TYPE: ICD-10-CM

## 2024-04-26 DIAGNOSIS — Z13.820 SCREENING FOR OSTEOPOROSIS: ICD-10-CM

## 2024-04-26 DIAGNOSIS — Z29.11 NEED FOR VACCINATION AGAINST RESPIRATORY SYNCYTIAL VIRUS: ICD-10-CM

## 2024-04-26 DIAGNOSIS — Z78.0 POST-MENOPAUSAL: ICD-10-CM

## 2024-04-26 DIAGNOSIS — Z23 NEED FOR SHINGLES VACCINE: ICD-10-CM

## 2024-04-26 DIAGNOSIS — Z00.00 ENCOUNTER FOR MEDICARE ANNUAL WELLNESS EXAM: Primary | ICD-10-CM

## 2024-04-26 DIAGNOSIS — Z12.31 VISIT FOR SCREENING MAMMOGRAM: ICD-10-CM

## 2024-04-26 DIAGNOSIS — E66.01 MORBID OBESITY (H): ICD-10-CM

## 2024-04-26 PROCEDURE — 99214 OFFICE O/P EST MOD 30 MIN: CPT | Mod: 25 | Performed by: PHYSICIAN ASSISTANT

## 2024-04-26 PROCEDURE — G0438 PPPS, INITIAL VISIT: HCPCS | Performed by: PHYSICIAN ASSISTANT

## 2024-04-26 RX ORDER — MULTIVITAMIN
1 TABLET ORAL
COMMUNITY

## 2024-04-26 RX ORDER — BUPROPION HYDROCHLORIDE 150 MG/1
150 TABLET ORAL DAILY
Qty: 90 TABLET | Refills: 2 | Status: SHIPPED | OUTPATIENT
Start: 2024-04-26

## 2024-04-26 RX ORDER — RESPIRATORY SYNCYTIAL VIRUS VACCINE 120MCG/0.5
0.5 KIT INTRAMUSCULAR ONCE
Qty: 1 EACH | Refills: 0 | Status: CANCELLED | OUTPATIENT
Start: 2024-04-26 | End: 2024-04-26

## 2024-04-26 RX ORDER — LEVOTHYROXINE SODIUM 88 UG/1
88 TABLET ORAL DAILY
Qty: 90 TABLET | Refills: 3 | Status: SHIPPED | OUTPATIENT
Start: 2024-04-26

## 2024-04-26 ASSESSMENT — PAIN SCALES - GENERAL: PAINLEVEL: NO PAIN (0)

## 2024-04-26 NOTE — PATIENT INSTRUCTIONS
Preventive Care Advice   This is general advice given by our system to help you stay healthy. However, your care team may have specific advice just for you. Please talk to your care team about your preventive care needs.  Nutrition  Eat 5 or more servings of fruits and vegetables each day.  Try wheat bread, brown rice and whole grain pasta (instead of white bread, rice, and pasta).  Get enough calcium and vitamin D. Check the label on foods and aim for 100% of the RDA (recommended daily allowance).  Lifestyle  Exercise at least 150 minutes each week   (30 minutes a day, 5 days a week).  Do muscle strengthening activities 2 days a week. These help control your weight and prevent disease.  No smoking.  Wear sunscreen to prevent skin cancer.  Have a dental exam and cleaning every 6 months.  Yearly exams  See your health care team every year to talk about:  Any changes in your health.  Any medicines your care team has prescribed.  Preventive care, family planning, and ways to prevent chronic diseases.  Shots (vaccines)   HPV shots (up to age 26), if you've never had them before.  Hepatitis B shots (up to age 59), if you've never had them before.  COVID-19 shot: Get this shot when it's due.  Flu shot: Get a flu shot every year.  Tetanus shot: Get a tetanus shot every 10 years.  Pneumococcal, hepatitis A, and RSV shots: Ask your care team if you need these based on your risk.  Shingles shot (for age 50 and up).  General health tests  Diabetes screening:  Starting at age 35, Get screened for diabetes at least every 3 years.  If you are younger than age 35, ask your care team if you should be screened for diabetes.  Cholesterol test: At age 39, start having a cholesterol test every 5 years, or more often if advised.  Bone density scan (DEXA): At age 50, ask your care team if you should have this scan for osteoporosis (brittle bones).  Hepatitis C: Get tested at least once in your life.  STIs (sexually transmitted  infections)  Before age 24: Ask your care team if you should be screened for STIs.  After age 24: Get screened for STIs if you're at risk. You are at risk for STIs (including HIV) if:  You are sexually active with more than one person.  You don't use condoms every time.  You or a partner was diagnosed with a sexually transmitted infection.  If you are at risk for HIV, ask about PrEP medicine to prevent HIV.  Get tested for HIV at least once in your life, whether you are at risk for HIV or not.  Cancer screening tests  Cervical cancer screening: If you have a cervix, begin getting regular cervical cancer screening tests at age 21. Most people who have regular screenings with normal results can stop after age 65. Talk about this with your provider.  Breast cancer scan (mammogram): If you've ever had breasts, begin having regular mammograms starting at age 40. This is a scan to check for breast cancer.  Colon cancer screening: It is important to start screening for colon cancer at age 45.  Have a colonoscopy test every 10 years (or more often if you're at risk) Or, ask your provider about stool tests like a FIT test every year or Cologuard test every 3 years.  To learn more about your testing options, visit: [email or web address]/[email or web address].  For help making a decision, visit: https://bit.ly/jo68307.  Prostate cancer screening test: If you have a prostate and are age 55 to 69, ask your provider if you would benefit from a yearly prostate cancer screening test.  Lung cancer screening: If you are a current or former smoker age 50 to 80, ask your care team if ongoing lung cancer screenings are right for you.  For informational purposes only. Not to replace the advice of your health care provider. Copyright   2023 [employer]. All rights reserved. Clinically reviewed by the [facility] Transitions Program. Info 253816 - REV 01/24.    Learning About Stress  What is stress?     Stress is your  body's response to a hard situation. Your body can have a physical, emotional, or mental response. Stress is a fact of life for most people, and it affects everyone differently. What causes stress for you may not be stressful for someone else.  A lot of things can cause stress. You may feel stress when you go on a job interview, take a test, or run a race. This kind of short-term stress is normal and even useful. It can help you if you need to work hard or react quickly. For example, stress can help you finish an important job on time.  Long-term stress is caused by ongoing stressful situations or events. Examples of long-term stress include long-term health problems, ongoing problems at work, or conflicts in your family. Long-term stress can harm your health.  How does stress affect your health?  When you are stressed, your body responds as though you are in danger. It makes hormones that speed up your heart, make you breathe faster, and give you a burst of energy. This is called the fight-or-flight stress response. If the stress is over quickly, your body goes back to normal and no harm is done.  But if stress happens too often or lasts too long, it can have bad effects. Long-term stress can make you more likely to get sick, and it can make symptoms of some diseases worse. If you tense up when you are stressed, you may develop neck, shoulder, or low back pain. Stress is linked to high blood pressure and heart disease.  Stress also harms your emotional health. It can make you clement, tense, or depressed. Your relationships may suffer, and you may not do well at work or school.  What can you do to manage stress?  You can try these things to help manage stress:   Do something active. Exercise or activity can help reduce stress. Walking is a great way to get started. Even everyday activities such as housecleaning or yard work can help.  Try yoga or michelle chi. These techniques combine exercise and meditation. You may need  some training at first to learn them.  Do something you enjoy. For example, listen to music or go to a movie. Practice your hobby or do volunteer work.  Meditate. This can help you relax, because you are not worrying about what happened before or what may happen in the future.  Do guided imagery. Imagine yourself in any setting that helps you feel calm. You can use online videos, books, or a teacher to guide you.  Do breathing exercises. For example:  From a standing position, bend forward from the waist with your knees slightly bent. Let your arms dangle close to the floor.  Breathe in slowly and deeply as you return to a standing position. Roll up slowly and lift your head last.  Hold your breath for just a few seconds in the standing position.  Breathe out slowly and bend forward from the waist.  Let your feelings out. Talk, laugh, cry, and express anger when you need to. Talking with supportive friends or family, a counselor, or a brent leader about your feelings is a healthy way to relieve stress. Avoid discussing your feelings with people who make you feel worse.  Write. It may help to write about things that are bothering you. This helps you find out how much stress you feel and what is causing it. When you know this, you can find better ways to cope.  What can you do to prevent stress?  You might try some of these things to help prevent stress:  Manage your time. This helps you find time to do the things you want and need to do.  Get enough sleep. Your body recovers from the stresses of the day while you are sleeping.  Get support. Your family, friends, and community can make a difference in how you experience stress.  Limit your news feed. Avoid or limit time on social media or news that may make you feel stressed.  Do something active. Exercise or activity can help reduce stress. Walking is a great way to get started.  Where can you learn more?  Go to https://www.healthwise.net/patiented  Enter N032 in the  "search box to learn more about \"Learning About Stress.\"  Current as of: October 24, 2023               Content Version: 14.0    1951-1982 [employer].   Care instructions adapted under license by your healthcare professional. If you have questions about a medical condition or this instruction, always ask your healthcare professional. [employer] disclaims any warranty or liability for your use of this information.      Bladder Training: Care Instructions  Your Care Instructions     Bladder training is used to treat urge incontinence and stress incontinence. Urge incontinence means that the need to urinate comes on so fast that you can't get to a toilet in time. Stress incontinence means that you leak urine because of pressure on your bladder. For example, it may happen when you laugh, cough, or lift something heavy.  Bladder training can increase how long you can wait before you have to urinate. It can also help your bladder hold more urine. And it can give you better control over the urge to urinate.  It is important to remember that bladder training takes a few weeks to a few months to make a difference. You may not see results right away, but don't give up.  Follow-up care is a key part of your treatment and safety. Be sure to make and go to all appointments, and call your doctor if you are having problems. It's also a good idea to know your test results and keep a list of the medicines you take.  How can you care for yourself at home?  Work with your doctor to come up with a bladder training program that is right for you. You may use one or more of the following methods.  Delayed urination  In the beginning, try to keep from urinating for 5 minutes after you first feel the need to go.  While you wait, take deep, slow breaths to relax. Kegel exercises can also help you delay the need to go to the bathroom.  After some practice, when you can easily wait 5 minutes to urinate, try to wait " "10 minutes before you urinate.  Slowly increase the waiting period until you are able to control when you have to urinate.  Scheduled urination  Empty your bladder when you first wake up in the morning.  Schedule times throughout the day when you will urinate.  Start by going to the bathroom every hour, even if you don't need to go.  Slowly increase the time between trips to the bathroom.  When you have found a schedule that works well for you, keep doing it.  If you wake up during the night and have to urinate, do it. Apply your schedule to waking hours only.  Kegel exercises  These tighten and strengthen pelvic muscles, which can help you control the flow of urine. (If doing these exercises causes pain, stop doing them and talk with your doctor.) To do Kegel exercises:  Squeeze your muscles as if you were trying not to pass gas. Or squeeze your muscles as if you were stopping the flow of urine. Your belly, legs, and buttocks shouldn't move.  Hold the squeeze for 3 seconds, then relax for 5 to 10 seconds.  Start with 3 seconds, then add 1 second each week until you are able to squeeze for 10 seconds.  Repeat the exercise 10 times a session. Do 3 to 8 sessions a day.  When should you call for help?  Watch closely for changes in your health, and be sure to contact your doctor if:    Your incontinence is getting worse.     You do not get better as expected.   Where can you learn more?  Go to https://www.[employer]MyHome.net/patiented  Enter V684 in the search box to learn more about \"Bladder Training: Care Instructions.\"  Current as of: November 15, 2023               Content Version: 14.0    6536-4480 [email or web address].   Care instructions adapted under license by your healthcare professional. If you have questions about a medical condition or this instruction, always ask your healthcare professional. [employer] disclaims any warranty or liability for your use of this information.      "
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
## 2024-04-29 ENCOUNTER — LAB (OUTPATIENT)
Dept: LAB | Facility: CLINIC | Age: 69
End: 2024-04-29
Payer: COMMERCIAL

## 2024-04-29 DIAGNOSIS — E78.2 MIXED HYPERLIPIDEMIA: ICD-10-CM

## 2024-04-29 DIAGNOSIS — E03.9 HYPOTHYROIDISM, UNSPECIFIED TYPE: ICD-10-CM

## 2024-04-29 DIAGNOSIS — R73.03 PRE-DIABETES: ICD-10-CM

## 2024-04-29 LAB
ANION GAP SERPL CALCULATED.3IONS-SCNC: 11 MMOL/L (ref 7–15)
BUN SERPL-MCNC: 11.3 MG/DL (ref 8–23)
CALCIUM SERPL-MCNC: 9.5 MG/DL (ref 8.8–10.2)
CHLORIDE SERPL-SCNC: 107 MMOL/L (ref 98–107)
CHOLEST SERPL-MCNC: 214 MG/DL
CREAT SERPL-MCNC: 1.04 MG/DL (ref 0.51–0.95)
DEPRECATED HCO3 PLAS-SCNC: 25 MMOL/L (ref 22–29)
EGFRCR SERPLBLD CKD-EPI 2021: 59 ML/MIN/1.73M2
FASTING STATUS PATIENT QL REPORTED: YES
GLUCOSE SERPL-MCNC: 108 MG/DL (ref 70–99)
HBA1C MFR BLD: 5.6 % (ref 0–5.6)
HDLC SERPL-MCNC: 53 MG/DL
LDLC SERPL CALC-MCNC: 129 MG/DL
NONHDLC SERPL-MCNC: 161 MG/DL
POTASSIUM SERPL-SCNC: 4.1 MMOL/L (ref 3.4–5.3)
SODIUM SERPL-SCNC: 143 MMOL/L (ref 135–145)
TRIGL SERPL-MCNC: 159 MG/DL
TSH SERPL DL<=0.005 MIU/L-ACNC: 1.11 UIU/ML (ref 0.3–4.2)

## 2024-04-29 PROCEDURE — 36415 COLL VENOUS BLD VENIPUNCTURE: CPT

## 2024-04-29 PROCEDURE — 83036 HEMOGLOBIN GLYCOSYLATED A1C: CPT

## 2024-04-29 PROCEDURE — 80048 BASIC METABOLIC PNL TOTAL CA: CPT

## 2024-04-29 PROCEDURE — 80061 LIPID PANEL: CPT

## 2024-04-29 PROCEDURE — 84443 ASSAY THYROID STIM HORMONE: CPT

## 2024-04-30 DIAGNOSIS — R79.89 ELEVATED SERUM CREATININE: Primary | ICD-10-CM

## 2024-05-21 ENCOUNTER — LAB (OUTPATIENT)
Dept: LAB | Facility: CLINIC | Age: 69
End: 2024-05-21
Payer: COMMERCIAL

## 2024-05-21 DIAGNOSIS — R79.89 ELEVATED SERUM CREATININE: ICD-10-CM

## 2024-05-21 LAB
ANION GAP SERPL CALCULATED.3IONS-SCNC: 8 MMOL/L (ref 7–15)
BUN SERPL-MCNC: 11.6 MG/DL (ref 8–23)
CALCIUM SERPL-MCNC: 9.3 MG/DL (ref 8.8–10.2)
CHLORIDE SERPL-SCNC: 105 MMOL/L (ref 98–107)
CREAT SERPL-MCNC: 0.85 MG/DL (ref 0.51–0.95)
DEPRECATED HCO3 PLAS-SCNC: 26 MMOL/L (ref 22–29)
EGFRCR SERPLBLD CKD-EPI 2021: 75 ML/MIN/1.73M2
GLUCOSE SERPL-MCNC: 98 MG/DL (ref 70–99)
POTASSIUM SERPL-SCNC: 4 MMOL/L (ref 3.4–5.3)
SODIUM SERPL-SCNC: 139 MMOL/L (ref 135–145)

## 2024-05-21 PROCEDURE — 80048 BASIC METABOLIC PNL TOTAL CA: CPT

## 2024-05-21 PROCEDURE — 36415 COLL VENOUS BLD VENIPUNCTURE: CPT

## 2024-06-21 ENCOUNTER — ANCILLARY PROCEDURE (OUTPATIENT)
Dept: MAMMOGRAPHY | Facility: CLINIC | Age: 69
End: 2024-06-21
Payer: COMMERCIAL

## 2024-06-21 DIAGNOSIS — Z12.31 VISIT FOR SCREENING MAMMOGRAM: ICD-10-CM

## 2024-06-21 PROCEDURE — 77063 BREAST TOMOSYNTHESIS BI: CPT | Mod: TC | Performed by: RADIOLOGY

## 2024-06-21 PROCEDURE — 77067 SCR MAMMO BI INCL CAD: CPT | Mod: TC | Performed by: RADIOLOGY

## 2025-03-21 SDOH — HEALTH STABILITY: PHYSICAL HEALTH: ON AVERAGE, HOW MANY MINUTES DO YOU ENGAGE IN EXERCISE AT THIS LEVEL?: 0 MIN

## 2025-03-21 SDOH — HEALTH STABILITY: PHYSICAL HEALTH: ON AVERAGE, HOW MANY DAYS PER WEEK DO YOU ENGAGE IN MODERATE TO STRENUOUS EXERCISE (LIKE A BRISK WALK)?: 0 DAYS

## 2025-03-21 ASSESSMENT — SOCIAL DETERMINANTS OF HEALTH (SDOH): HOW OFTEN DO YOU GET TOGETHER WITH FRIENDS OR RELATIVES?: MORE THAN THREE TIMES A WEEK

## 2025-03-26 ENCOUNTER — OFFICE VISIT (OUTPATIENT)
Dept: FAMILY MEDICINE | Facility: CLINIC | Age: 70
End: 2025-03-26
Payer: COMMERCIAL

## 2025-03-26 VITALS
RESPIRATION RATE: 16 BRPM | HEIGHT: 64 IN | BODY MASS INDEX: 37.18 KG/M2 | WEIGHT: 217.8 LBS | DIASTOLIC BLOOD PRESSURE: 77 MMHG | OXYGEN SATURATION: 96 % | HEART RATE: 82 BPM | TEMPERATURE: 97.8 F | SYSTOLIC BLOOD PRESSURE: 132 MMHG

## 2025-03-26 DIAGNOSIS — E78.2 MIXED HYPERLIPIDEMIA: ICD-10-CM

## 2025-03-26 DIAGNOSIS — F32.9 REACTIVE DEPRESSION (SITUATIONAL): Primary | ICD-10-CM

## 2025-03-26 DIAGNOSIS — E03.9 HYPOTHYROIDISM, UNSPECIFIED TYPE: ICD-10-CM

## 2025-03-26 DIAGNOSIS — R73.03 PRE-DIABETES: ICD-10-CM

## 2025-03-26 DIAGNOSIS — H61.22 IMPACTED CERUMEN OF LEFT EAR: ICD-10-CM

## 2025-03-26 LAB
ANION GAP SERPL CALCULATED.3IONS-SCNC: 11 MMOL/L (ref 7–15)
BUN SERPL-MCNC: 9.8 MG/DL (ref 8–23)
CALCIUM SERPL-MCNC: 9.3 MG/DL (ref 8.8–10.4)
CHLORIDE SERPL-SCNC: 103 MMOL/L (ref 98–107)
CHOLEST SERPL-MCNC: 233 MG/DL
CREAT SERPL-MCNC: 0.88 MG/DL (ref 0.51–0.95)
EGFRCR SERPLBLD CKD-EPI 2021: 72 ML/MIN/1.73M2
EST. AVERAGE GLUCOSE BLD GHB EST-MCNC: 114 MG/DL
FASTING STATUS PATIENT QL REPORTED: YES
FASTING STATUS PATIENT QL REPORTED: YES
GLUCOSE SERPL-MCNC: 107 MG/DL (ref 70–99)
HBA1C MFR BLD: 5.6 % (ref 0–5.6)
HCO3 SERPL-SCNC: 25 MMOL/L (ref 22–29)
HDLC SERPL-MCNC: 61 MG/DL
LDLC SERPL CALC-MCNC: 136 MG/DL
NONHDLC SERPL-MCNC: 172 MG/DL
POTASSIUM SERPL-SCNC: 4.2 MMOL/L (ref 3.4–5.3)
SODIUM SERPL-SCNC: 139 MMOL/L (ref 135–145)
TRIGL SERPL-MCNC: 178 MG/DL
TSH SERPL DL<=0.005 MIU/L-ACNC: 0.9 UIU/ML (ref 0.3–4.2)

## 2025-03-26 PROCEDURE — 80048 BASIC METABOLIC PNL TOTAL CA: CPT | Performed by: PHYSICIAN ASSISTANT

## 2025-03-26 PROCEDURE — 36415 COLL VENOUS BLD VENIPUNCTURE: CPT | Performed by: PHYSICIAN ASSISTANT

## 2025-03-26 PROCEDURE — 84443 ASSAY THYROID STIM HORMONE: CPT | Performed by: PHYSICIAN ASSISTANT

## 2025-03-26 PROCEDURE — 83036 HEMOGLOBIN GLYCOSYLATED A1C: CPT | Performed by: PHYSICIAN ASSISTANT

## 2025-03-26 PROCEDURE — 80061 LIPID PANEL: CPT | Performed by: PHYSICIAN ASSISTANT

## 2025-03-26 RX ORDER — LEVOTHYROXINE SODIUM 88 UG/1
88 TABLET ORAL DAILY
Qty: 90 TABLET | Refills: 3 | Status: SHIPPED | OUTPATIENT
Start: 2025-03-26

## 2025-03-26 RX ORDER — BUPROPION HYDROCHLORIDE 150 MG/1
150 TABLET ORAL DAILY
Qty: 90 TABLET | Refills: 3 | Status: SHIPPED | OUTPATIENT
Start: 2025-03-26

## 2025-03-26 ASSESSMENT — PATIENT HEALTH QUESTIONNAIRE - PHQ9
SUM OF ALL RESPONSES TO PHQ QUESTIONS 1-9: 3
10. IF YOU CHECKED OFF ANY PROBLEMS, HOW DIFFICULT HAVE THESE PROBLEMS MADE IT FOR YOU TO DO YOUR WORK, TAKE CARE OF THINGS AT HOME, OR GET ALONG WITH OTHER PEOPLE: NOT DIFFICULT AT ALL
SUM OF ALL RESPONSES TO PHQ QUESTIONS 1-9: 3

## 2025-03-26 ASSESSMENT — PAIN SCALES - GENERAL: PAINLEVEL_OUTOF10: NO PAIN (0)
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
## 2025-03-26 NOTE — PROGRESS NOTES
Patient identified using two patient identifiers.  Ear exam showing wax occlusion completed by RN.  Solution: warm water and H202/H20 was placed in the left ear(s) via irrigation tool: elephant ear.       Mallory Knight MA

## 2025-04-30 ENCOUNTER — PATIENT OUTREACH (OUTPATIENT)
Dept: CARE COORDINATION | Facility: CLINIC | Age: 70
End: 2025-04-30
Payer: COMMERCIAL

## 2025-05-02 SDOH — HEALTH STABILITY: PHYSICAL HEALTH: ON AVERAGE, HOW MANY MINUTES DO YOU ENGAGE IN EXERCISE AT THIS LEVEL?: 0 MIN

## 2025-05-02 SDOH — HEALTH STABILITY: PHYSICAL HEALTH: ON AVERAGE, HOW MANY DAYS PER WEEK DO YOU ENGAGE IN MODERATE TO STRENUOUS EXERCISE (LIKE A BRISK WALK)?: 0 DAYS

## 2025-05-02 ASSESSMENT — SOCIAL DETERMINANTS OF HEALTH (SDOH): HOW OFTEN DO YOU GET TOGETHER WITH FRIENDS OR RELATIVES?: MORE THAN THREE TIMES A WEEK

## 2025-05-05 ENCOUNTER — OFFICE VISIT (OUTPATIENT)
Dept: FAMILY MEDICINE | Facility: CLINIC | Age: 70
End: 2025-05-05
Payer: COMMERCIAL

## 2025-05-05 VITALS
TEMPERATURE: 98.1 F | HEIGHT: 64 IN | SYSTOLIC BLOOD PRESSURE: 144 MMHG | OXYGEN SATURATION: 98 % | WEIGHT: 217 LBS | RESPIRATION RATE: 22 BRPM | BODY MASS INDEX: 37.05 KG/M2 | DIASTOLIC BLOOD PRESSURE: 80 MMHG | HEART RATE: 83 BPM

## 2025-05-05 DIAGNOSIS — F32.9 REACTIVE DEPRESSION (SITUATIONAL): ICD-10-CM

## 2025-05-05 DIAGNOSIS — E66.01 MORBID OBESITY (H): ICD-10-CM

## 2025-05-05 DIAGNOSIS — E03.9 HYPOTHYROIDISM, UNSPECIFIED TYPE: ICD-10-CM

## 2025-05-05 DIAGNOSIS — Z13.820 SCREENING FOR OSTEOPOROSIS: ICD-10-CM

## 2025-05-05 DIAGNOSIS — R03.0 ELEVATED BLOOD PRESSURE READING WITHOUT DIAGNOSIS OF HYPERTENSION: ICD-10-CM

## 2025-05-05 DIAGNOSIS — Z78.0 ASYMPTOMATIC POSTMENOPAUSAL STATUS: ICD-10-CM

## 2025-05-05 DIAGNOSIS — E78.2 MIXED HYPERLIPIDEMIA: ICD-10-CM

## 2025-05-05 DIAGNOSIS — Z00.00 ENCOUNTER FOR MEDICARE ANNUAL WELLNESS EXAM: Primary | ICD-10-CM

## 2025-05-05 DIAGNOSIS — Z12.31 VISIT FOR SCREENING MAMMOGRAM: ICD-10-CM

## 2025-05-05 PROCEDURE — G2211 COMPLEX E/M VISIT ADD ON: HCPCS | Performed by: PHYSICIAN ASSISTANT

## 2025-05-05 PROCEDURE — 99214 OFFICE O/P EST MOD 30 MIN: CPT | Mod: 25 | Performed by: PHYSICIAN ASSISTANT

## 2025-05-05 PROCEDURE — 3078F DIAST BP <80 MM HG: CPT | Performed by: PHYSICIAN ASSISTANT

## 2025-05-05 PROCEDURE — G0439 PPPS, SUBSEQ VISIT: HCPCS | Performed by: PHYSICIAN ASSISTANT

## 2025-05-05 PROCEDURE — 3077F SYST BP >= 140 MM HG: CPT | Performed by: PHYSICIAN ASSISTANT

## 2025-05-05 PROCEDURE — 1126F AMNT PAIN NOTED NONE PRSNT: CPT | Performed by: PHYSICIAN ASSISTANT

## 2025-05-05 ASSESSMENT — PAIN SCALES - GENERAL: PAINLEVEL_OUTOF10: NO PAIN (0)

## 2025-05-05 NOTE — PATIENT INSTRUCTIONS
Your blood pressure was elevated at your appointment today. Elevated blood pressure can increase your risk of a heart attack, stroke and heart failure. For this reason, it is important to monitor your blood pressure closely. I would recommend monitoring blood pressure at home and scheduling follow-up if blood pressure is elevated.     Choosing a home blood pressure monitor  The American Heart Association recommends an automatic, cuff-style, upper arm (biceps) monitor.  Wrist and finger monitors are not recommended. They give less reliable readings.  Choose a monitor that has been validated. Go to validateBP.org for a list of blood pressure cuffs.  Make sure the cuff fits. Measure around your upper arm and choose a monitor that comes with the correct cuff size.    How to use a home blood pressure monitor  When preparing to take your blood pressure:  Plan ahead. Don t smoke, drink caffeinated beverages or exercise within 30 minutes before taking your blood pressure. Empty your bladder.  Don't take the measurement over clothes. Remove the clothing over the arm that will be used to measure blood pressure.  You can use either arm. Usually there is not a big difference between readings on them.  Be still. Allow at least five minutes of quiet rest before measurements. Don t talk or use the phone.  Sit correctly. Sit with your back straight and supported (on a dining chair, rather than a sofa). Your feet should be flat on the floor. Do not cross your legs. Support your arm on a flat surface. The middle of the cuff should be placed on the upper arm at heart level. Check your monitor's instructions for an illustration.  Measure at the same time every day. Take the readings at the same time each day. Talk with your health care professional about how often to take your blood pressure.  Take multiple readings and record the results. Each time you measure, take two readings one minute apart. Record the results. If your monitor  has built-in memory to store your readings, take it with you to your medical appointments. Some monitors may also let you upload your readings to a secure website.     If your numbers are consistently above 130/80, please schedule a follow-up visit to discuss.          Patient Education   Preventive Care Advice   This is general advice given by our system to help you stay healthy. However, your care team may have specific advice just for you. Please talk to your care team about your preventive care needs.  Nutrition  Eat 5 or more servings of fruits and vegetables each day.  Try wheat bread, brown rice and whole grain pasta (instead of white bread, rice, and pasta).  Get enough calcium and vitamin D. Check the label on foods and aim for 100% of the RDA (recommended daily allowance).  Lifestyle  Exercise at least 150 minutes each week  (30 minutes a day, 5 days a week).  Do muscle strengthening activities 2 days a week. These help control your weight and prevent disease.  No smoking.  Wear sunscreen to prevent skin cancer.  Have a dental exam and cleaning every 6 months.  Yearly exams  See your health care team every year to talk about:  Any changes in your health.  Any medicines your care team has prescribed.  Preventive care, family planning, and ways to prevent chronic diseases.  Shots (vaccines)   HPV shots (up to age 26), if you've never had them before.  Hepatitis B shots (up to age 59), if you've never had them before.  COVID-19 shot: Get this shot when it's due.  Flu shot: Get a flu shot every year.  Tetanus shot: Get a tetanus shot every 10 years.  Pneumococcal, hepatitis A, and RSV shots: Ask your care team if you need these based on your risk.  Shingles shot (for age 50 and up)  General health tests  Diabetes screening:  Starting at age 35, Get screened for diabetes at least every 3 years.  If you are younger than age 35, ask your care team if you should be screened for diabetes.  Cholesterol test: At age  39, start having a cholesterol test every 5 years, or more often if advised.  Bone density scan (DEXA): At age 50, ask your care team if you should have this scan for osteoporosis (brittle bones).  Hepatitis C: Get tested at least once in your life.  STIs (sexually transmitted infections)  Before age 24: Ask your care team if you should be screened for STIs.  After age 24: Get screened for STIs if you're at risk. You are at risk for STIs (including HIV) if:  You are sexually active with more than one person.  You don't use condoms every time.  You or a partner was diagnosed with a sexually transmitted infection.  If you are at risk for HIV, ask about PrEP medicine to prevent HIV.  Get tested for HIV at least once in your life, whether you are at risk for HIV or not.  Cancer screening tests  Cervical cancer screening: If you have a cervix, begin getting regular cervical cancer screening tests starting at age 21.  Breast cancer scan (mammogram): If you've ever had breasts, begin having regular mammograms starting at age 40. This is a scan to check for breast cancer.  Colon cancer screening: It is important to start screening for colon cancer at age 45.  Have a colonoscopy test every 10 years (or more often if you're at risk) Or, ask your provider about stool tests like a FIT test every year or Cologuard test every 3 years.  To learn more about your testing options, visit:   .  For help making a decision, visit:   https://bit.ly/vr67212.  Prostate cancer screening test: If you have a prostate, ask your care team if a prostate cancer screening test (PSA) at age 55 is right for you.  Lung cancer screening: If you are a current or former smoker ages 50 to 80, ask your care team if ongoing lung cancer screenings are right for you.  For informational purposes only. Not to replace the advice of your health care provider. Copyright   2023 [employer]. All rights reserved. Clinically reviewed by the [facility]  [place] [facility]. [employer] 879816 - REV 01/24.  Hearing Loss: Care Instructions  Overview     Hearing loss is a sudden or slow decrease in how well you hear. It can range from slight to profound. Permanent hearing loss can occur with aging. It also can happen when you are exposed long-term to loud noise. Examples include listening to loud music, riding motorcycles, or being around other loud machines.  Hearing loss can affect your work and home life. It can make you feel lonely or depressed. You may feel that you have lost your independence. But hearing aids and other devices can help you hear better and feel connected to others.  Follow-up care is a key part of your treatment and safety. Be sure to make and go to all appointments, and call your doctor if you are having problems. It's also a good idea to know your test results and keep a list of the medicines you take.  How can you care for yourself at home?  Avoid loud noises whenever possible. This helps keep your hearing from getting worse.  Always wear hearing protection around loud noises.  Wear a hearing aid as directed.  A professional can help you pick a hearing aid that will work best for you.  You can also get hearing aids over the counter for mild to moderate hearing loss.  Have hearing tests as your doctor suggests. They can show whether your hearing has changed. Your hearing aid may need to be adjusted.  Use other devices as needed. These may include:  Telephone amplifiers and hearing aids that can connect to a television, stereo, radio, or microphone.  Devices that use lights or vibrations. These alert you to the doorbell, a ringing telephone, or a baby monitor.  Television closed-captioning. This shows the words at the bottom of the screen. Most new TVs can do this.  TTY (text telephone). This lets you type messages back and forth on the telephone instead of talking or listening. These devices are also called TDD. When messages are typed  "on the keyboard, they are sent over the phone line to a receiving TTY. The message is shown on a monitor.  Use text messaging, social media, and email if it is hard for you to communicate by telephone.  Try to learn a listening technique called speechreading. It is not lipreading. You pay attention to people's gestures, expressions, posture, and tone of voice. These clues can help you understand what a person is saying. Face the person you are talking to, and have them face you. Make sure the lighting is good. You need to see the other person's face clearly.  Think about counseling if you need help to adjust to your hearing loss.  When should you call for help?  Watch closely for changes in your health, and be sure to contact your doctor if:    You think your hearing is getting worse.     You have new symptoms, such as dizziness or nausea.   Where can you learn more?  Go to https://www.[employer].Me/patiented  Enter R798 in the search box to learn more about \"Hearing Loss: Care Instructions.\"  Current as of: October 27, 2024  Content Version: 14.4    5078-9774 [employer].   Care instructions adapted under license by your healthcare professional. If you have questions about a medical condition or this instruction, always ask your healthcare professional. [employer] disclaims any warranty or liability for your use of this information.    Learning About Sleeping Well  What does sleeping well mean?     Sleeping well means getting enough sleep to feel good and stay healthy. How much sleep is enough varies among people.  The number of hours you sleep and how you feel when you wake up are both important. If you do not feel refreshed, you probably need more sleep. Another sign of not getting enough sleep is feeling tired during the day.  Experts recommend that adults get at least 7 or more hours of sleep per day. Children and older adults need more sleep.  Why is getting enough sleep " "important?  Getting enough quality sleep is a basic part of good health. When your sleep suffers, your physical health, mood, and your thoughts can suffer too. You may find yourself feeling more grumpy or stressed. Not getting enough sleep also can lead to serious problems, including injury, accidents, anxiety, and depression.  What might cause poor sleeping?  Many things can cause sleep problems, including:  Changes to your sleep schedule.  Stress. Stress can be caused by fear about a single event, such as giving a speech. Or you may have ongoing stress, such as worry about work or school.  Depression, anxiety, and other mental or emotional conditions.  Changes in your sleep habits or surroundings. This includes changes that happen where you sleep, such as noise, light, or sleeping in a different bed. It also includes changes in your sleep pattern, such as having jet lag or working a late shift.  Health problems, such as pain, breathing problems, and restless legs syndrome.  Lack of regular exercise.  Using alcohol, nicotine, or caffeine before bed.  How can you help yourself?  Here are some tips that may help you sleep more soundly and wake up feeling more refreshed.  Your sleeping area   Use your bedroom only for sleeping and sex. A bit of light reading may help you fall asleep. But if it doesn't, do your reading elsewhere in the house. Try not to use your TV, computer, smartphone, or tablet while you are in bed.  Be sure your bed is big enough to stretch out comfortably, especially if you have a sleep partner.  Keep your bedroom quiet, dark, and cool. Use curtains, blinds, or a sleep mask to block out light. To block out noise, use earplugs, soothing music, or a \"white noise\" machine.  Your evening and bedtime routine   Create a relaxing bedtime routine. You might want to take a warm shower or bath, or listen to soothing music.  Go to bed at the same time every night. And get up at the same time every morning, " "even if you feel tired.  What to avoid   Limit caffeine (coffee, tea, caffeinated sodas) during the day, and don't have any for at least 6 hours before bedtime.  Avoid drinking alcohol before bedtime. Alcohol can cause you to wake up more often during the night.  Try not to smoke or use tobacco, especially in the evening. Nicotine can keep you awake.  Limit naps during the day, especially close to bedtime.  Avoid lying in bed awake for too long. If you can't fall asleep or if you wake up in the middle of the night and can't get back to sleep within about 20 minutes, get out of bed and go to another room until you feel sleepy.  Avoid taking medicine right before bed that may keep you awake or make you feel hyper or energized. Your doctor can tell you if your medicine may do this and if you can take it earlier in the day.  If you can't sleep   Imagine yourself in a peaceful, pleasant scene. Focus on the details and feelings of being in a place that is relaxing.  Get up and do a quiet or boring activity until you feel sleepy.  Avoid drinking any liquids before going to bed to help prevent waking up often to use the bathroom.  Where can you learn more?  Go to https://www.TAP[employer].net/patiented  Enter J942 in the search box to learn more about \"Learning About Sleeping Well.\"  Current as of: July 31, 2024  Content Version: 14.4    1137-6219 [employer].   Care instructions adapted under license by your healthcare professional. If you have questions about a medical condition or this instruction, always ask your healthcare professional. [employer] disclaims any warranty or liability for your use of this information.    Bladder Training: Care Instructions  Your Care Instructions     Bladder training is used to treat urge incontinence and stress incontinence. Urge incontinence means that the need to urinate comes on so fast that you can't get to a toilet in time. Stress incontinence means that you " leak urine because of pressure on your bladder. For example, it may happen when you laugh, cough, or lift something heavy.  Bladder training can increase how long you can wait before you have to urinate. It can also help your bladder hold more urine. And it can give you better control over the urge to urinate.  It is important to remember that bladder training takes a few weeks to a few months to make a difference. You may not see results right away, but don't give up.  Follow-up care is a key part of your treatment and safety. Be sure to make and go to all appointments, and call your doctor if you are having problems. It's also a good idea to know your test results and keep a list of the medicines you take.  How can you care for yourself at home?  Work with your doctor to come up with a bladder training program that is right for you. You may use one or more of the following methods.  Delayed urination  In the beginning, try to keep from urinating for 5 minutes after you first feel the need to go.  While you wait, take deep, slow breaths to relax. Kegel exercises can also help you delay the need to go to the bathroom.  After some practice, when you can easily wait 5 minutes to urinate, try to wait 10 minutes before you urinate.  Slowly increase the waiting period until you are able to control when you have to urinate.  Scheduled urination  Empty your bladder when you first wake up in the morning.  Schedule times throughout the day when you will urinate.  Start by going to the bathroom every hour, even if you don't need to go.  Slowly increase the time between trips to the bathroom.  When you have found a schedule that works well for you, keep doing it.  If you wake up during the night and have to urinate, do it. Apply your schedule to waking hours only.  Kegel exercises  These tighten and strengthen pelvic muscles, which can help you control the flow of urine. (If doing these exercises causes pain, stop doing them  "and talk with your doctor.) To do Kegel exercises:  Squeeze your muscles as if you were trying not to pass gas. Or squeeze your muscles as if you were stopping the flow of urine. Your belly, legs, and buttocks shouldn't move.  Hold the squeeze for 3 seconds, then relax for 5 to 10 seconds.  Start with 3 seconds, then add 1 second each week until you are able to squeeze for 10 seconds.  Repeat the exercise 10 times a session. Do 3 to 8 sessions a day.  When should you call for help?  Watch closely for changes in your health, and be sure to contact your doctor if:    Your incontinence is getting worse.     You do not get better as expected.   Where can you learn more?  Go to https://www.[employer].net/patiented  Enter V684 in the search box to learn more about \"Bladder Training: Care Instructions.\"  Current as of: April 30, 2024  Content Version: 14.4    4356-0493 [employer].   Care instructions adapted under license by your healthcare professional. If you have questions about a medical condition or this instruction, always ask your healthcare professional. [employer] disclaims any warranty or liability for your use of this information.       "

## 2025-05-05 NOTE — PROGRESS NOTES
"Preventive Care Visit  [facility] TITA Moe PA-C, Family Medicine  May 5, 2025      Assessment & Plan     Encounter for Medicare annual wellness exam  She had skin screening exam with dermatology 12/2023, due for annual check.    Asymptomatic postmenopausal status  Screening for osteoporosis  - DEXA HIP/PELVIS/SPINE - Future; Future    Visit for screening mammogram  - MA Screen Bilateral w/Pelon; Future    Elevated blood pressure reading without diagnosis of hypertension  Discussed home BP monitoring vs returning for nurse BP check and she would prefer to return for nurse BP check in next few months. If BP elevated at that time, would schedule visit to discuss further.    Mixed hyperlipidemia  Reviewed recent lipids, ASCVD risk score, recommendation to consider statin. She declines medication at this time and would prefer to try to work on lifestyle management and continue to monitor. She would consider medication in the future if needed.    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 He passed away in 2021. She has been taking wellbutrin  mg daily and her mood has been really good. Tolerating medication well without side effects or concerns. She would like to stay on same medication for now but would consider tapering off in the future.      Hypothyroidism, unspecified type  Chronic, clinically euthyroid. Recent labs wnl. Continue same dose of levothyroxine.     Pre-diabetes  Stable, recent A1c 5.6    Morbid obesity (H)  BMI  Estimated body mass index is 36.96 kg/m  as calculated from the following:    Height as of this encounter: 1.632 m (5' 4.25\").    Weight as of this encounter: 98.4 kg (217 lb).   Weight management plan: Discussed healthy diet and exercise guidelines    Counseling  Appropriate preventive services were addressed with this patient via screening, questionnaire, or " discussion as appropriate for fall prevention, nutrition, physical activity, Tobacco-use cessation, social engagement, weight loss and cognition.  Checklist reviewing preventive services available has been given to the patient.  Reviewed patient's diet, addressing concerns and/or questions.   The patient was instructed to see the dentist every 6 months.   Discussed possible causes of fatigue. The patient was provided with written information regarding signs of hearing loss.   Information on urinary incontinence and treatment options given to patient.       Follow-up    Follow-up Visit   Expected date:  Jun 05, 2025 (Approximate)      Follow Up Appointment Details:     Follow-up with whom?: Other Primary Care Services    Follow-Up for what?: Clinic Staff Visit (MA, LPN, SANTIAGO)    How?: In Person    Is this an as-needed follow-up?: No             Follow-up Visit   Expected date:  May 12, 2026 (Approximate)      Follow Up Appointment Details:     Follow-up with whom?: PCP    Follow-Up for what?: Medicare Wellness    Welcome or Annual?: Annual Wellness    How?: In Person               The longitudinal plan of care for the diagnosis(es)/condition(s) as documented were addressed during this visit. Due to the added complexity in care, I will continue to support Latha in the subsequent management and with ongoing continuity of care.    May Azul is a 69 year old, presenting for the following:  Medicare Visit        5/5/2025     4:40 PM   Additional Questions   Roomed by Tamy HENDERSON         No additional concerns.     HPI    Doing well    Advance Care Planning    Document on file is a Health Care Directive or POLST.        5/2/2025   General Health   How would you rate your overall physical health? Good   Feel stress (tense, anxious, or unable to sleep) Only a little   (!) STRESS CONCERN      5/2/2025   Nutrition   Diet: Regular (no restrictions)         5/2/2025   Exercise   Days per week of moderate/strenous  exercise 0 days   Average minutes spent exercising at this level 0 min   (!) EXERCISE CONCERN      5/2/2025   Social Factors   Frequency of gathering with friends or relatives More than three times a week   Worry food won't last until get money to buy more No   Food not last or not have enough money for food? No   Do you have housing? (Housing is defined as stable permanent housing and does not include staying outside in a car, in a tent, in an abandoned building, in an overnight shelter, or couch-surfing.) Yes   Are you worried about losing your housing? No   Lack of transportation? No   Unable to get utilities (heat,electricity)? No         5/2/2025   Fall Risk   Fallen 2 or more times in the past year? No   Trouble with walking or balance? No          5/2/2025   Activities of Daily Living- Home Safety   Needs help with the following daily activites None of the above   Safety concerns in the home None of the above         5/2/2025   Dental   Dentist two times every year? (!) NO         5/2/2025   Hearing Screening   Hearing concerns? (!) TROUBLE UNDERSTANDING SOFT OR WHISPERED SPEECH.         5/2/2025   Driving Risk Screening   Patient/family members have concerns about driving No         5/2/2025   General Alertness/Fatigue Screening   Have you been more tired than usual lately? (!) YES         5/2/2025   Urinary Incontinence Screening   Bothered by leaking urine in past 6 months Yes       Today's PHQ-9 Score:       5/5/2025     4:35 PM   PHQ-9 SCORE   PHQ-9 Total Score MyChart 2 (Minimal depression)   PHQ-9 Total Score 2        Patient-reported         5/2/2025   Substance Use   Alcohol more than 3/day or more than 7/wk No   Do you have a current opioid prescription? No   How severe/bad is pain from 1 to 10? 1/10   Do you use any other substances recreationally? No     Social History     Tobacco Use    Smoking status: Former     Current packs/day: 0.00     Types: Cigarettes     Start date: 1/1/1976     Quit  date: 2002     Years since quittin.3    Smokeless tobacco: Never   Vaping Use    Vaping status: Never Used   Substance Use Topics    Alcohol use: Yes     Alcohol/week: 0.0 - 3.3 standard drinks of alcohol     Comment: rarely    Drug use: No           2024   LAST FHS-7 RESULTS   1st degree relative breast or ovarian cancer No   Any relative bilateral breast cancer No   Any male have breast cancer No   Any ONE woman have BOTH breast AND ovarian cancer No   Any woman with breast cancer before 50yrs No   2 or more relatives with breast AND/OR ovarian cancer No   2 or more relatives with breast AND/OR bowel cancer No        Mammogram Screening - Mammogram every 1-2 years updated in Health Maintenance based on mutual decision making      History of abnormal Pap smear: Status post hysterectomy with removal of cervix and no history of CIN2 or greater or cervical cancer. Health Maintenance and Surgical History updated.       ASCVD Risk   The 10-year ASCVD risk score (Keysha LEUNG, et al., 2019) is: 8.9%    Values used to calculate the score:      Age: 69 years      Sex: Female      Is Non- : No      Diabetic: No      Tobacco smoker: No      Systolic Blood Pressure: 144 mmHg      Is BP treated: No      HDL Cholesterol: 61 mg/dL      Total Cholesterol: 233 mg/dL            Reviewed and updated as needed this visit by Provider   Tobacco  Allergies  Meds  Problems  Med Hx  Surg Hx  Fam Hx            Past Medical History:   Diagnosis Date    Arthritis     I've had 2 hip replacements    Hypothyroidism      Past Surgical History:   Procedure Laterality Date    ABDOMEN SURGERY      tummy tuck    COLONOSCOPY  2018    COLONOSCOPY N/A 2023    Procedure: Colonoscopy;  Surgeon: Charly Worrell MD;  Location: [facility]    HYSTERECTOMY, PAP NO LONGER INDICATED  2001    fibroid    SURGICAL HISTORY OF -       bladder tack; pelvic support surgery    SURGICAL HISTORY OF -    age 34    tonsillectomy    SURGICAL HISTORY OF -       tummy tuck    SURGICAL HISTORY OF -   2007    right hip replacement     Labs reviewed in EPIC  BP Readings from Last 3 Encounters:   25 (!) 144/80   25 132/77   24 132/78    Wt Readings from Last 3 Encounters:   25 98.4 kg (217 lb)   25 98.8 kg (217 lb 12.8 oz)   24 97.5 kg (215 lb)                  Patient Active Problem List   Diagnosis    Reactive depression (situational)    Hypothyroidism    OA (osteoarthritis)    Pre-diabetes    Elevated blood pressure reading without diagnosis of hypertension    Morbid obesity (H)    Mixed hyperlipidemia     Past Surgical History:   Procedure Laterality Date    ABDOMEN SURGERY      tummy edumagen    COLONOSCOPY  2018    COLONOSCOPY N/A 2023    Procedure: Colonoscopy;  Surgeon: Charly Worrell MD;  Location: [facility]    HYSTERECTOMY, PAP NO LONGER INDICATED  2001    fibroid    SURGICAL HISTORY OF -       bladder tack; pelvic support surgery    SURGICAL HISTORY OF -   age 34    tonsillectomy    SURGICAL HISTORY OF -       tummy tuck    SURGICAL HISTORY OF -   2007    right hip replacement       Social History     Tobacco Use    Smoking status: Former     Current packs/day: 0.00     Types: Cigarettes     Start date: 1976     Quit date: 2002     Years since quittin.3    Smokeless tobacco: Never   Substance Use Topics    Alcohol use: Yes     Alcohol/week: 0.0 - 3.3 standard drinks of alcohol     Comment: rarely     Family History   Problem Relation Age of Onset    Endocrine Disease Mother         hypothyroid    Depression Mother     Lung Cancer Mother     Hyperlipidemia Mother     Thyroid Disease Mother     Depression Maternal Grandmother     Endocrine Disease Sister         diabetes    Diabetes Sister          from Diabetes when whe was 10    Endocrine Disease Maternal Uncle         diabetes    Hypertension Father     Hyperlipidemia Father      Connective Tissue Disorder Sister         RA    Hypothyroidism Sister     Multiple Sclerosis Sister     Thyroid Disease Sister          Current Outpatient Medications   Medication Sig Dispense Refill    buPROPion (WELLBUTRIN XL) 150 MG 24 hr tablet Take 1 tablet (150 mg) by mouth daily. 90 tablet 3    fish oil-omega-3 fatty acids 1000 MG capsule Take 2 g by mouth      levothyroxine (SYNTHROID/LEVOTHROID) 88 MCG tablet Take 1 tablet (88 mcg) by mouth daily. 90 tablet 3    Multiple Vitamin (ONE-A-DAY ESSENTIAL) TABS Take 1 tablet by mouth       Allergies   Allergen Reactions    Codeine Nausea and Vomiting     Horrible nausea and vomiting    Fentanyl Nausea and Vomiting     Horrible nausea and vomiting    Morphine Hcl      No Morphine based med--sweaty and vomiting     Recent Labs   Lab Test 03/26/25  0958 05/21/24  0919 04/29/24  0811 03/29/23  1213 11/22/21  1503 11/22/21  1503 09/22/20  1135   A1C 5.6  --  5.6 5.4   < > 5.4 5.5   *  --  129* 141*   < > 148* 132*   HDL 61  --  53 63   < > 54 55   TRIG 178*  --  159* 145   < > 218* 239*   ALT  --   --   --   --   --  29  --    CR 0.88 0.85 1.04* 0.93   < > 0.81 0.93   GFRESTIMATED 72 75 59* 68   < > 77 65   GFRESTBLACK  --   --   --   --   --   --  76   POTASSIUM 4.2 4.0 4.1 4.6   < > 4.1 4.5   TSH 0.90  --  1.11 1.05   < > 0.85 0.88    < > = values in this interval not displayed.      Current providers sharing in care for this patient include:  Patient Care Team:  Cyndie Moe PA-C as PCP - General (Family Medicine)  Cyndie Moe PA-C as Assigned PCP  Moi Rodriguez MD as Assigned Dermatology Provider    The following health maintenance items are reviewed in Epic and correct as of today:  Health Maintenance   Topic Date Due    DEXA  Never done    ZOSTER IMMUNIZATION (1 of 2) Never done    DTAP/TDAP/TD IMMUNIZATION (2 - Td or Tdap) 12/23/2024    COVID-19 Vaccine (6 - 2024-25 season) 03/20/2025    PHQ-9  11/05/2025    LIPID  03/26/2026    TSH W/FREE T4  "REFLEX  03/26/2026    MEDICARE ANNUAL WELLNESS VISIT  05/05/2026    ANNUAL REVIEW OF HM ORDERS  05/05/2026    FALL RISK ASSESSMENT  05/05/2026    MAMMO SCREENING  06/21/2026    DIABETES SCREENING  03/26/2028    ADVANCE CARE PLANNING  05/05/2030    RSV VACCINE (1 - 1-dose 75+ series) 05/14/2032    COLORECTAL CANCER SCREENING  07/13/2033    HEPATITIS C SCREENING  Completed    DEPRESSION ACTION PLAN  Completed    INFLUENZA VACCINE  Completed    Pneumococcal Vaccine: 50+ Years  Completed    HPV IMMUNIZATION  Aged Out    MENINGITIS IMMUNIZATION  Aged Out         Review of Systems  Constitutional, HEENT, cardiovascular, pulmonary, gi and gu systems are negative, except as otherwise noted.     Objective    Exam  BP (!) 144/80   Pulse 83   Temp 98.1  F (36.7  C) (Oral)   Resp 22   Ht 1.632 m (5' 4.25\")   Wt 98.4 kg (217 lb)   LMP  (LMP Unknown)   SpO2 98%   BMI 36.96 kg/m     Estimated body mass index is 36.96 kg/m  as calculated from the following:    Height as of this encounter: 1.632 m (5' 4.25\").    Weight as of this encounter: 98.4 kg (217 lb).    Physical Exam  GENERAL: alert and no distress  EYES: Eyes grossly normal to inspection, PERRL and conjunctivae and sclerae normal  HENT: ear canals and TM's normal, nose and mouth without ulcers or lesions  NECK: no adenopathy, no asymmetry, masses, or scars  RESP: lungs clear to auscultation - no rales, rhonchi or wheezes  CV: regular rate and rhythm, normal S1 S2, no S3 or S4, no murmur, click or rub, no peripheral edema  ABDOMEN: soft, nontender, no hepatosplenomegaly, no masses and bowel sounds normal  MS: no gross musculoskeletal defects noted, no edema  NEURO: Normal strength and tone, mentation intact and speech normal  PSYCH: mentation appears normal, affect normal/bright         5/5/2025   Mini Cog   Clock Draw Score 2 Normal   3 Item Recall 3 objects recalled   Mini Cog Total Score 5              Signed Electronically by: Cyndie Moe PA-C    Answers " submitted by the patient for this visit:  Patient Health Questionnaire (Submitted on 5/5/2025)  If you checked off any problems, how difficult have these problems made it for you to do your work, take care of things at home, or get along with other people?: Not difficult at all  PHQ9 TOTAL SCORE: 2

## 2025-05-06 ENCOUNTER — PATIENT OUTREACH (OUTPATIENT)
Dept: CARE COORDINATION | Facility: CLINIC | Age: 70
End: 2025-05-06
Payer: COMMERCIAL

## 2025-05-08 ENCOUNTER — PATIENT OUTREACH (OUTPATIENT)
Dept: CARE COORDINATION | Facility: CLINIC | Age: 70
End: 2025-05-08
Payer: COMMERCIAL

## 2025-07-07 ENCOUNTER — ALLIED HEALTH/NURSE VISIT (OUTPATIENT)
Dept: FAMILY MEDICINE | Facility: CLINIC | Age: 70
End: 2025-07-07
Payer: COMMERCIAL

## 2025-07-07 VITALS — SYSTOLIC BLOOD PRESSURE: 152 MMHG | DIASTOLIC BLOOD PRESSURE: 88 MMHG

## 2025-07-07 DIAGNOSIS — R03.0 ELEVATED BLOOD PRESSURE READING WITHOUT DIAGNOSIS OF HYPERTENSION: Primary | ICD-10-CM

## 2025-07-07 PROCEDURE — 99207 PR NO CHARGE NURSE ONLY: CPT

## 2025-07-07 PROCEDURE — 3077F SYST BP >= 140 MM HG: CPT

## 2025-07-07 PROCEDURE — 3079F DIAST BP 80-89 MM HG: CPT

## 2025-07-07 NOTE — PROGRESS NOTES
Debra Jo Ganser is a 70 year old year old patient who comes in today for a Blood Pressure check because of ongoing blood pressure monitoring.  Vital Signs as repeated by RN Jenna Mendoza RN   Patient is not taking medication as prescribed. (Not taking any blood pressure medications).   Patient is not monitoring Blood Pressure at home.    Current complaints: none  Disposition:  Scheduled appointment per office visit note advised to call if patient develops any symptoms. Patient will purchase an OTC BP cuff and monitor BP's home until her upcoming appointment.     Patient complains of aching pain for 3-4 months in her right shoulder. States the pain is worse at night. Denies any recent injury. Patient demonstrates full ROM. Scheduled for office visit to discuss 7/11/25.     Annel Mendoza RN on 7/7/2025 at 10:36 AM

## 2025-07-11 ENCOUNTER — OFFICE VISIT (OUTPATIENT)
Dept: FAMILY MEDICINE | Facility: CLINIC | Age: 70
End: 2025-07-11
Payer: COMMERCIAL

## 2025-07-11 ENCOUNTER — ANCILLARY PROCEDURE (OUTPATIENT)
Dept: GENERAL RADIOLOGY | Facility: CLINIC | Age: 70
End: 2025-07-11
Attending: PHYSICIAN ASSISTANT
Payer: COMMERCIAL

## 2025-07-11 VITALS
OXYGEN SATURATION: 97 % | DIASTOLIC BLOOD PRESSURE: 73 MMHG | HEIGHT: 65 IN | WEIGHT: 216 LBS | HEART RATE: 97 BPM | SYSTOLIC BLOOD PRESSURE: 117 MMHG | BODY MASS INDEX: 35.99 KG/M2 | RESPIRATION RATE: 16 BRPM

## 2025-07-11 DIAGNOSIS — G89.29 CHRONIC RIGHT SHOULDER PAIN: ICD-10-CM

## 2025-07-11 DIAGNOSIS — G89.29 CHRONIC RIGHT SHOULDER PAIN: Primary | ICD-10-CM

## 2025-07-11 DIAGNOSIS — M25.511 CHRONIC RIGHT SHOULDER PAIN: ICD-10-CM

## 2025-07-11 DIAGNOSIS — M25.511 CHRONIC RIGHT SHOULDER PAIN: Primary | ICD-10-CM

## 2025-07-11 PROCEDURE — 1126F AMNT PAIN NOTED NONE PRSNT: CPT | Performed by: PHYSICIAN ASSISTANT

## 2025-07-11 PROCEDURE — 3078F DIAST BP <80 MM HG: CPT | Performed by: PHYSICIAN ASSISTANT

## 2025-07-11 PROCEDURE — 99213 OFFICE O/P EST LOW 20 MIN: CPT | Performed by: PHYSICIAN ASSISTANT

## 2025-07-11 PROCEDURE — 3074F SYST BP LT 130 MM HG: CPT | Performed by: PHYSICIAN ASSISTANT

## 2025-07-11 PROCEDURE — 73030 X-RAY EXAM OF SHOULDER: CPT | Mod: TC | Performed by: RADIOLOGY

## 2025-07-11 ASSESSMENT — PAIN SCALES - GENERAL: PAINLEVEL_OUTOF10: NO PAIN (0)

## 2025-07-11 NOTE — PROGRESS NOTES
"  Assessment & Plan     Chronic right shoulder pain  I am going to recommend physical therapy based off interpretation of the xray but will update if radiology suggests alternatively. For now short trial of nsaids and physical therapy. To ortho if not improving  - XR Shoulder Right G/E 3 Views; Future  - Physical Therapy  Referral; Future      Subjective   Latha is a 70 year old, presenting for the following health issues:  Shoulder Pain      7/11/2025    10:52 AM   Additional Questions   Roomed by WINSOME Azul Jo Ganser is a 70 year old female who presents today for a \"months old\" history of right shoulder pain  Seems to worsen as the day goes on when carrying things especially  If sleeping on that shoulder she can wake with significant pain  Certain ROM are more painful than others  She denies any fabio injury hx prior to the onset  No n/t in the arm; no neck pain  Using aspirin prn        Review of Systems  Constitutional, HEENT, cardiovascular, pulmonary, gi and gu systems are negative, except as otherwise noted.      Objective    /73   Pulse 97   Resp 16   Ht 1.638 m (5' 4.5\")   Wt 98 kg (216 lb)   LMP  (LMP Unknown)   SpO2 97%   BMI 36.50 kg/m    Body mass index is 36.5 kg/m .  Physical Exam   GENERAL: alert and no distress  MS: minimal pain over the right deltoid; none at AC. Minimally posisitive impingement testing. Definitive pain with abduction just shy of 90. Neg spurling testing    Xray - Reviewed and interpreted by me.  Overall spacing looks reasonable without severe osteoarthritis changes. Await radiology        Signed Electronically by: Adolph Adams PA-C    "

## 2025-07-18 NOTE — PROGRESS NOTES
PHYSICAL THERAPY EVALUATION  Type of Visit: Evaluation       Fall Risk Screen:  Have you fallen 2 or more times in the past year?: No  Have you fallen and had an injury in the past year?: No    Subjective         Presenting condition or subjective complaint: Shoulder  Date of onset: 02/05/25 (Per pt report)    Relevant medical history: Arthritis   Dates & types of surgery: Many most recent, hip replacement in 2019    Prior diagnostic imaging/testing results: X-ray     Prior therapy history for the same diagnosis, illness or injury: No      Pt is a 70 year old female presenting with complaints of right shoulder pain. Pt reports that since seeing referring provider, she has decreased her overall activity/decreased using right arm. Pt points to superior shoulder as where pain starts, and reports that it will shoot up into the posterior neck when it gets bad. Pt also notes she has started to drop objects from her hand more often. 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    The symptoms started in February and is getting better but still ongoing .    Pt describes the pain as a constant soreness and rates the pain an 8/10 at its worst and a 1/10 at its best- pain does fluctuate.     Aggravating Factors: as the day goes on, carrying objects/grandchild, sleeping on R shoulder, some shoulder mobility, dressing     Alleviating Factors: activity modification, Aleve     Prior treatments: none at this time     Sleep Quality: will wake pt up in the middle of the night - typically able to fall back asleep but difficult (typically a good sleeper)    Red Flags: pt dropping objects - will continue to monitor (pt unsure if this is related to shoulder or if this was before shoulder)    Pt goals: lifting/carrying, ROM    X-ray shoulder 7/11/25: IMPRESSION: No fracture or dislocation. Mild degenerative changes at the glenohumeral and acromioclavicular  joints. Tiny calcification along the superior margin of the greater tuberosity of the humeral head.     Past Medical History:   Diagnosis Date    Arthritis 2006    I've had 2 hip replacements    Hypothyroidism      Past Surgical History:   Procedure Laterality Date    ABDOMEN SURGERY  2009    tummy tuck    COLONOSCOPY  2018    Modt current 2022 or 2023    COLONOSCOPY N/A 07/13/2023    Procedure: Colonoscopy;  Surgeon: Charly Worrell MD;  Location: [facility]    HYSTERECTOMY, PAP NO LONGER INDICATED  01/01/2001    fibroid    SURGICAL HISTORY OF -       bladder tack; pelvic support surgery    SURGICAL HISTORY OF -   age 34    tonsillectomy    SURGICAL HISTORY OF -       tummy tuck    SURGICAL HISTORY OF -   01/01/2007    right hip replacement         Living Environment  Social support: With family members   Type of home: House   Stairs to enter the home: Yes 6 Is there a railing: Yes     Ramp: No   Stairs inside the home: Yes 14 Is there a railing: Yes     Help at home: None  Equipment owned:       Employment: Not Applicable    Hobbies/Interests: Doing puzzles,playing games, working on the yard, spending toE with family , some traveling    Patient goals for therapy: Lift, use shoulder without pain     Objective   SHOULDER:    Posture: elevated and rounded/forward shoulders    Shoulder ROM (* Denotes Pain):   Left (AROM/PROM) Right (AROM/PROM)   Flexion 165 145/WFL*   Scaption 160 150/WFL*   Extension     ER (@0) 70 55/60   ER (@90)     IR (@0) T8 T10   IR (@ 90)       End Feels: empty on R     Shoulder Strength (* Denotes Pain):    Left  Right    Flexion 5/5 5-/5   Scaption 4+/5 4-/5   Extension     ER (@0) 5/5 5-/5   ER (@90)     IR (@0) 5/5 4/5   IR (@ 90)       Palpation: minor TTP noted along supraspinatus on R    Cervical Screen Relevant Findings: NT    Special tests:   Left  Right    Impingement     Neer - +   Guzman Saji - +   Coracoid Impingement     Posterior Impingement     JOEL'Ronak     RC Tear     Drop  Arm     ER Lag     Lift Off     Belly Press     Empty Can (impingement)/Full Can (RC) -/- +/-   Labrum     O'Briens     Crank     Dynamic Labral Shear      Crossover Test     Compression-Rotation     Biceps     Speed's     Dexterrvictorino's     AC Joint     Crossover     Shear     Instability      Apprehension + Relocation      Load and Shift     Sulcus               GH/Capsular Mobility  L  R   Posterior glide     Inferior glide     Anterior glide          Assessment & Plan   CLINICAL IMPRESSIONS  Medical Diagnosis: Chronic right shoulder pain    Treatment Diagnosis: Right shoulder pain   Impression/Assessment: Patient is a 70 year old female with right shoulder pain complaints.  The following significant findings have been identified: Pain, Decreased ROM/flexibility, Decreased joint mobility, Decreased strength, Impaired muscle performance, and Decreased activity tolerance. These impairments interfere with their ability to perform self care tasks, recreational activities, household chores, and driving  as compared to previous level of function.     Clinical Decision Making (Complexity):  Clinical Presentation: Stable/Uncomplicated  Clinical Presentation Rationale: based on medical and personal factors listed in PT evaluation  Clinical Decision Making (Complexity): Low complexity    PLAN OF CARE  Treatment Interventions:  Modalities: Cryotherapy, E-stim, Hot Pack, Ultrasound  Interventions: Manual Therapy, Neuromuscular Re-education, Therapeutic Activity, Therapeutic Exercise, Self-Care/Home Management    Long Term Goals     PT Goal 1  Goal Identifier: AROM  Goal Description: Pt will be able to reach overhead and out to the side (ER) with the R arm symmetrical to LUE without >1/10 pain in order to improve overall function  Rationale: to maximize safety and independence with performance of ADLs and functional tasks;to maximize safety and independence within the home;to maximize safety and independence with self  cares  Target Date: 09/01/25  PT Goal 2  Goal Identifier: Lifting  Goal Description: Pt will be able to lift at least 3 pounds overhead x10 without >1/10 pain in order to improve ability to do tasks around the house  Rationale: to maximize safety and independence with self cares;to maximize safety and independence with performance of ADLs and functional tasks;to maximize safety and independence within the home  Target Date: 09/29/25  PT Goal 3  Goal Identifier: Carrying  Goal Description: Pt will be able to carry grandchildren for at least 10 minutes at a time without >1/10 pain in order to improve ability to care for grandchildren  Rationale: to maximize safety and independence with performance of ADLs and functional tasks;to maximize safety and independence within the home  Target Date: 10/13/25      Frequency of Treatment: 1x/week, decreasing to every other as appropriate  Duration of Treatment: 12 weeks    Recommended Referrals to Other Professionals: none at this time  Education Assessment:   Learner/Method: Patient    Risks and benefits of evaluation/treatment have been explained.   Patient/Family/caregiver agrees with Plan of Care.     Evaluation Time:     PT Eval, Low Complexity Minutes (04418): 20     Signing Clinician: Debby Prado, PT        [facility]                                                                                   OUTPATIENT PHYSICAL THERAPY      PLAN OF TREATMENT FOR OUTPATIENT REHABILITATION   Patient's Last Name, First Name, M.I. Ganser,Debra Jo YOB: 1955   Provider's Name   [facility]   Medical Record No.  [id number]     Onset Date: 02/05/25 (Per pt report)  Start of Care Date: 07/21/25     Medical Diagnosis:  Chronic right shoulder pain      PT Treatment Diagnosis:  Right shoulder pain Plan of Treatment  Frequency/Duration: 1x/week, decreasing to every other as appropriate/ 12  weeks    Certification date from 07/21/25 to 10/13/25         See note for plan of treatment details and functional goals     Debby Prado, PT                         I CERTIFY THE NEED FOR THESE SERVICES FURNISHED UNDER        THIS PLAN OF TREATMENT AND WHILE UNDER MY CARE     (Physician attestation of this document indicates review and certification of the therapy plan).              Referring Provider:  Adolph Adams    Initial Assessment  See Epic Evaluation- Start of Care Date: 07/21/25

## 2025-07-20 ASSESSMENT — ACTIVITIES OF DAILY LIVING (ADL)
WASHING_YOUR_HAIR?: 2
PUTTING_ON_YOUR_PANTS: 0
WHEN_LYING_ON_THE_INVOLVED_SIDE: 8
WASHING_YOUR_BACK: 8
PUTTING_ON_A_SHIRT_THAT_BUTTONS_DOWN_THE_FRONT: 0
REMOVING_SOMETHING_FROM_YOUR_BACK_POCKET: 1
TOUCHING_THE_BACK_OF_YOUR_NECK: 8
PUTTING_ON_AN_UNDERSHIRT_OR_A_PULLOVER_SWEATER: 4
PUSHING_WITH_THE_INVOLVED_ARM: 0
CARRYING_A_HEAVY_OBJECT_OF_10_POUNDS: 8
PLEASE_INDICATE_YOR_PRIMARY_REASON_FOR_REFERRAL_TO_THERAPY:: SHOULDER
AT_ITS_WORST?: 8
PLACING_AN_OBJECT_ON_A_HIGH_SHELF: 4
REACHING_FOR_SOMETHING_ON_A_HIGH_SHELF: 9

## 2025-07-21 ENCOUNTER — THERAPY VISIT (OUTPATIENT)
Dept: PHYSICAL THERAPY | Facility: CLINIC | Age: 70
End: 2025-07-21
Attending: PHYSICIAN ASSISTANT
Payer: COMMERCIAL

## 2025-07-21 DIAGNOSIS — G89.29 CHRONIC RIGHT SHOULDER PAIN: ICD-10-CM

## 2025-07-21 DIAGNOSIS — M25.511 CHRONIC RIGHT SHOULDER PAIN: ICD-10-CM

## 2025-07-21 PROCEDURE — 97110 THERAPEUTIC EXERCISES: CPT | Mod: GP

## 2025-07-21 PROCEDURE — 97161 PT EVAL LOW COMPLEX 20 MIN: CPT | Mod: GP

## 2025-07-30 ENCOUNTER — THERAPY VISIT (OUTPATIENT)
Dept: PHYSICAL THERAPY | Facility: CLINIC | Age: 70
End: 2025-07-30
Attending: PHYSICIAN ASSISTANT
Payer: COMMERCIAL

## 2025-07-30 DIAGNOSIS — G89.29 CHRONIC RIGHT SHOULDER PAIN: Primary | ICD-10-CM

## 2025-07-30 DIAGNOSIS — M25.511 CHRONIC RIGHT SHOULDER PAIN: Primary | ICD-10-CM

## 2025-07-30 PROCEDURE — 97110 THERAPEUTIC EXERCISES: CPT | Mod: GP | Performed by: PHYSICAL THERAPIST

## 2025-07-30 PROCEDURE — 97010 HOT OR COLD PACKS THERAPY: CPT | Mod: GP | Performed by: PHYSICAL THERAPIST

## 2025-07-30 PROCEDURE — 97140 MANUAL THERAPY 1/> REGIONS: CPT | Mod: GP | Performed by: PHYSICAL THERAPIST

## 2025-08-05 ENCOUNTER — THERAPY VISIT (OUTPATIENT)
Dept: PHYSICAL THERAPY | Facility: CLINIC | Age: 70
End: 2025-08-05
Attending: PHYSICIAN ASSISTANT
Payer: COMMERCIAL

## 2025-08-05 DIAGNOSIS — M25.511 CHRONIC RIGHT SHOULDER PAIN: Primary | ICD-10-CM

## 2025-08-05 DIAGNOSIS — G89.29 CHRONIC RIGHT SHOULDER PAIN: Primary | ICD-10-CM

## 2025-08-05 PROCEDURE — 97110 THERAPEUTIC EXERCISES: CPT | Mod: GP | Performed by: PHYSICAL THERAPIST

## 2025-08-05 PROCEDURE — 97140 MANUAL THERAPY 1/> REGIONS: CPT | Mod: GP | Performed by: PHYSICAL THERAPIST

## 2025-08-05 PROCEDURE — 97010 HOT OR COLD PACKS THERAPY: CPT | Mod: GP | Performed by: PHYSICAL THERAPIST

## 2025-08-11 ENCOUNTER — PATIENT OUTREACH (OUTPATIENT)
Dept: CARE COORDINATION | Facility: CLINIC | Age: 70
End: 2025-08-11
Payer: COMMERCIAL

## 2025-08-18 ENCOUNTER — THERAPY VISIT (OUTPATIENT)
Dept: PHYSICAL THERAPY | Facility: CLINIC | Age: 70
End: 2025-08-18
Attending: PHYSICIAN ASSISTANT
Payer: COMMERCIAL

## 2025-08-18 DIAGNOSIS — M25.511 CHRONIC RIGHT SHOULDER PAIN: Primary | ICD-10-CM

## 2025-08-18 DIAGNOSIS — G89.29 CHRONIC RIGHT SHOULDER PAIN: Primary | ICD-10-CM

## 2025-08-18 PROCEDURE — 97140 MANUAL THERAPY 1/> REGIONS: CPT | Mod: GP | Performed by: PHYSICAL THERAPIST

## 2025-08-18 PROCEDURE — 97010 HOT OR COLD PACKS THERAPY: CPT | Mod: GP | Performed by: PHYSICAL THERAPIST

## 2025-08-18 PROCEDURE — 97110 THERAPEUTIC EXERCISES: CPT | Mod: GP | Performed by: PHYSICAL THERAPIST

## (undated) DEVICE — KIT ENDO TURNOVER/PROCEDURE W/CLEAN A SCOPE LINERS 103888

## (undated) RX ORDER — ONDANSETRON 2 MG/ML
INJECTION INTRAMUSCULAR; INTRAVENOUS
Status: DISPENSED
Start: 2023-07-13